# Patient Record
Sex: MALE | Race: WHITE | NOT HISPANIC OR LATINO | Employment: OTHER | URBAN - METROPOLITAN AREA
[De-identification: names, ages, dates, MRNs, and addresses within clinical notes are randomized per-mention and may not be internally consistent; named-entity substitution may affect disease eponyms.]

---

## 2018-05-22 ENCOUNTER — TELEPHONE (OUTPATIENT)
Dept: GASTROENTEROLOGY | Facility: CLINIC | Age: 68
End: 2018-05-22

## 2018-07-19 ENCOUNTER — MEDICAL CORRESPONDENCE (OUTPATIENT)
Dept: HEALTH INFORMATION MANAGEMENT | Facility: CLINIC | Age: 68
End: 2018-07-19

## 2018-07-19 ENCOUNTER — TRANSFERRED RECORDS (OUTPATIENT)
Dept: HEALTH INFORMATION MANAGEMENT | Facility: CLINIC | Age: 68
End: 2018-07-19

## 2018-07-20 ENCOUNTER — TRANSFERRED RECORDS (OUTPATIENT)
Dept: HEALTH INFORMATION MANAGEMENT | Facility: CLINIC | Age: 68
End: 2018-07-20

## 2018-08-02 ENCOUNTER — TELEPHONE (OUTPATIENT)
Dept: GASTROENTEROLOGY | Facility: CLINIC | Age: 68
End: 2018-08-02

## 2018-08-02 NOTE — TELEPHONE ENCOUNTER
Patient scheduled for EGD     Indication for procedure. Scott's esophagus with dysplasia    Referring Provider. Ijeoma Chaudhari    ? No     Arrival time verified? Patient to arrive at 220 pm     Facility location verified? [address]     Instructions given regarding prep and procedure. Transportation policy reviewed and verbalized understanding.     Prep Type NPO     Are you taking any anticoagulants or blood thinners? Denies     Instructions given? Yes     Electronic implanted devices? Denies     Pre procedure teaching completed? Yes    Transportation from procedure? Yes     H&P / Pre op physical completed? N/A    Samuel Burton RN

## 2018-08-08 ENCOUNTER — HOSPITAL ENCOUNTER (OUTPATIENT)
Facility: CLINIC | Age: 68
Discharge: HOME | End: 2018-08-08
Attending: INTERNAL MEDICINE | Admitting: INTERNAL MEDICINE
Payer: MEDICARE

## 2018-08-08 ENCOUNTER — TELEPHONE (OUTPATIENT)
Dept: GASTROENTEROLOGY | Facility: CLINIC | Age: 68
End: 2018-08-08

## 2018-08-08 ENCOUNTER — SURGERY (OUTPATIENT)
Age: 68
End: 2018-08-08

## 2018-08-08 VITALS
OXYGEN SATURATION: 94 % | RESPIRATION RATE: 18 BRPM | DIASTOLIC BLOOD PRESSURE: 82 MMHG | SYSTOLIC BLOOD PRESSURE: 116 MMHG

## 2018-08-08 LAB
GLUCOSE BLDC GLUCOMTR-MCNC: 119 MG/DL (ref 70–99)
UPPER GI ENDOSCOPY: NORMAL

## 2018-08-08 PROCEDURE — 88305 TISSUE EXAM BY PATHOLOGIST: CPT | Performed by: INTERNAL MEDICINE

## 2018-08-08 PROCEDURE — 40000141 ZZH STATISTIC PERIPHERAL IV START W/O US GUIDANCE

## 2018-08-08 PROCEDURE — 82962 GLUCOSE BLOOD TEST: CPT

## 2018-08-08 PROCEDURE — 43239 EGD BIOPSY SINGLE/MULTIPLE: CPT | Performed by: INTERNAL MEDICINE

## 2018-08-08 PROCEDURE — 25000128 H RX IP 250 OP 636: Performed by: INTERNAL MEDICINE

## 2018-08-08 PROCEDURE — 99152 MOD SED SAME PHYS/QHP 5/>YRS: CPT | Performed by: INTERNAL MEDICINE

## 2018-08-08 PROCEDURE — 25000125 ZZHC RX 250: Performed by: INTERNAL MEDICINE

## 2018-08-08 RX ORDER — FENTANYL CITRATE 50 UG/ML
INJECTION, SOLUTION INTRAMUSCULAR; INTRAVENOUS PRN
Status: DISCONTINUED | OUTPATIENT
Start: 2018-08-08 | End: 2018-08-08 | Stop reason: HOSPADM

## 2018-08-08 RX ORDER — ONDANSETRON 2 MG/ML
4 INJECTION INTRAMUSCULAR; INTRAVENOUS EVERY 6 HOURS PRN
Status: CANCELLED | OUTPATIENT
Start: 2018-08-08

## 2018-08-08 RX ORDER — LIDOCAINE 40 MG/G
CREAM TOPICAL
Status: DISCONTINUED | OUTPATIENT
Start: 2018-08-08 | End: 2018-08-08 | Stop reason: HOSPADM

## 2018-08-08 RX ORDER — FLUMAZENIL 0.1 MG/ML
0.2 INJECTION, SOLUTION INTRAVENOUS
Status: CANCELLED | OUTPATIENT
Start: 2018-08-08 | End: 2018-08-09

## 2018-08-08 RX ORDER — ONDANSETRON 4 MG/1
4 TABLET, ORALLY DISINTEGRATING ORAL EVERY 6 HOURS PRN
Status: CANCELLED | OUTPATIENT
Start: 2018-08-08

## 2018-08-08 RX ORDER — ONDANSETRON 2 MG/ML
4 INJECTION INTRAMUSCULAR; INTRAVENOUS
Status: DISCONTINUED | OUTPATIENT
Start: 2018-08-08 | End: 2018-08-08 | Stop reason: HOSPADM

## 2018-08-08 RX ORDER — NALOXONE HYDROCHLORIDE 0.4 MG/ML
.1-.4 INJECTION, SOLUTION INTRAMUSCULAR; INTRAVENOUS; SUBCUTANEOUS
Status: CANCELLED | OUTPATIENT
Start: 2018-08-08 | End: 2018-08-09

## 2018-08-08 RX ADMIN — FENTANYL CITRATE 100 MCG: 50 INJECTION, SOLUTION INTRAMUSCULAR; INTRAVENOUS at 16:09

## 2018-08-08 RX ADMIN — MIDAZOLAM 1 MG: 1 INJECTION INTRAMUSCULAR; INTRAVENOUS at 16:09

## 2018-08-08 RX ADMIN — TOPICAL ANESTHETIC 1 SPRAY: 200 SPRAY DENTAL; PERIODONTAL at 16:12

## 2018-08-08 RX ADMIN — MIDAZOLAM 1 MG: 1 INJECTION INTRAMUSCULAR; INTRAVENOUS at 16:16

## 2018-08-08 NOTE — IP AVS SNAPSHOT
MRN:[id number]                      After Visit Summary   8/8/2018    Clyde Figueroa    MRN: [id number]           Thank you!     Thank you for choosing [facility] for your care. Our goal is always to provide you with excellent care. Hearing back from our patients is one way we can continue to improve our services. Please take a few minutes to complete the written survey that you may receive in the mail after you visit with us. Thank you!        Patient Information     Date Of Birth          1950        About your hospital stay     You were admitted on:  August 8, 2018 You last received care in the:  [facility], Endoscopy    You were discharged on:  August 8, 2018       Who to Call     For medical emergencies, please call 911.  For non-urgent questions about your medical care, please call your primary care provider or clinic, [phone]  For questions related to your surgery, please call your surgery clinic        Attending Provider     Provider Darren Carter MD Gastroenterology       Primary Care Provider Office Phone # Fax #    Luis Turpin [phone]      Further instructions from your care team       Discharge Instructions after  Upper Endoscopy (EGD)    Activity and Diet  You were given medicine for pain. You may be dizzy or sleepy.  For 24 hours:    Do not drive or use heavy equipment.    Do not make important decisions.    Do not drink any alcohol.  __x_ You may return to your regular diet.    Discomfort  You may have a sore throat for 2 to 3 days. It may help to:    Avoid hot liquids for 24 hours.    Use sore throat lozenges.    Gargle as needed with salt water up to 4 times a day. Mix 1 cup of warm water  with 1 teaspoon of salt. Do not swallow.    You may take Tylenol (acetaminophen) for pain unless your doctor has told you not to.    Do not take aspirin or ibuprofen (Advil, Motrin) or other NSAIDS  (anti-inflammatory drugs) for _3__  "days.    Follow-up  _x__ We took small tissue samples for study. If you do not have a follow-up visit scheduled,  call your provider s office in 2 weeks for the results.    When to call us:  Problems are rare. Call right away if you have:    Unusual throat pain or trouble swallowing    Unusual pain in belly or chest that is not relieved by belching or passing air    Black stools (tar-like looking bowel movement)    Temperature above 100.6  F. (37.5  C).    If you vomit blood or have severe pain, go to an emergency room.    If you have questions, call:  Monday to Friday, 7 a.m. to 4:30 p.m.: Endoscopy: [phone] (We may have to call you back)    After hours: Hospital: [phone] (Ask for the GI fellow on call)    Pending Results     No orders found from 2018 to 2018.            Admission Information     Date & Time Provider Department Dept. Phone    2018 Darren Dye MD [facility], [place], Endoscopy [phone]      Your Vitals Were     Blood Pressure Respirations Pulse Oximetry             115/65 10 96%         [employer] Information     [employer] lets you send messages to your doctor, view your test results, renew your prescriptions, schedule appointments and more. To sign up, go to [email or web address] . Click on \"Log in\" on the left side of the screen, which will take you to the Welcome page. Then click on \"Sign up Now\" on the right side of the page.     You will be asked to enter the access code listed below, as well as some personal information. Please follow the directions to create your username and password.     Your access code is: [email or web address]  Expires: 2018  4:38 PM     Your access code will  in 90 days. If you need help or a new code, please call your [place] clinic or [phone].        Care EveryWhere ID     This is your Care EveryWhere ID. This could be used by other organizations to access your [place] medical records  [id number]        Equal Access to Services  "    ABDULKADIR [facility]: Hadii aad ku hadomaro Sodenisali, waaxda luqadaha, qaybta kaalmada adeegyada, adrianna yong chafreddy trevino pawelleidy duran . So [facility] [phone].    ATENCIÓN: Si habla español, tiene a blanco disposición servicios gratuitos de asistencia lingüística. Llame al [phone].    We comply with applicable federal civil rights laws and [place] laws. We do not discriminate on the basis of race, color, national origin, age, disability, sex, sexual orientation, or gender identity.               Review of your medicines      CONTINUE these medicines which have NOT CHANGED        Dose / Directions    ASPIRIN ADULT LOW STRENGTH PO        Refills:  0       dutasteride 0.5 MG capsule   Commonly known as:  AVODART        Dose:  0.5 mg   Take 0.5 mg by mouth every 48 hours   Refills:  0       GLIMEPIRIDE PO        Dose:  4 mg   Take 4 mg by mouth 2 times daily   Refills:  0       LISINOPRIL PO        Take by mouth daily   Refills:  0       metFORMIN 500 MG/5ML Soln solution   Commonly known as:  GLUCOPHAGE        Take by mouth 2 times daily (with meals)   Refills:  0       metoprolol tartrate 25 MG tablet   Commonly known as:  LOPRESSOR   Used for:  Atrial fibrillation (H)        Dose:  25 mg   Take 1 tablet (25 mg) by mouth 2 times daily   Quantity:  60 tablet   Refills:  0       pantoprazole 40 MG EC tablet   Commonly known as:  PROTONIX   Used for:  Gastroesophageal reflux disease with esophagitis        Dose:  40 mg   Take 1 tablet (40 mg) by mouth daily   Quantity:  90 tablet   Refills:  0       ROPINIROLE HCL PO        Dose:  0.25 mg   Take 0.25 mg by mouth At Bedtime   Refills:  0       simvastatin 40 MG tablet   Commonly known as:  ZOCOR        Dose:  40 mg   Take 1 tablet (40 mg) by mouth every evening   Quantity:  30 tablet   Refills:  0       testosterone 50 MG/5GM (1%) topical gel   Commonly known as:  ANDROGEL/TESTIM        Place onto the skin daily   Refills:  0       VITAMIN D (CHOLECALCIFEROL) PO         Dose:  2000 Units   Take 2,000 Units by mouth daily   Refills:  0                Protect others around you: Learn how to safely use, store and throw away your medicines at www.disposemymeds.org.             Medication List: This is a list of all your medications and when to take them. Check marks below indicate your daily home schedule. Keep this list as a reference.      Medications           Morning Afternoon Evening Bedtime As Needed    ASPIRIN ADULT LOW STRENGTH PO                                dutasteride 0.5 MG capsule   Commonly known as:  AVODART   Take 0.5 mg by mouth every 48 hours                                GLIMEPIRIDE PO   Take 4 mg by mouth 2 times daily                                LISINOPRIL PO   Take by mouth daily                                metFORMIN 500 MG/5ML Soln solution   Commonly known as:  GLUCOPHAGE   Take by mouth 2 times daily (with meals)                                metoprolol tartrate 25 MG tablet   Commonly known as:  LOPRESSOR   Take 1 tablet (25 mg) by mouth 2 times daily                                pantoprazole 40 MG EC tablet   Commonly known as:  PROTONIX   Take 1 tablet (40 mg) by mouth daily                                ROPINIROLE HCL PO   Take 0.25 mg by mouth At Bedtime                                simvastatin 40 MG tablet   Commonly known as:  ZOCOR   Take 1 tablet (40 mg) by mouth every evening                                testosterone 50 MG/5GM (1%) topical gel   Commonly known as:  ANDROGEL/TESTIM   Place onto the skin daily                                VITAMIN D (CHOLECALCIFEROL) PO   Take 2,000 Units by mouth daily

## 2018-08-08 NOTE — DISCHARGE INSTRUCTIONS
Discharge Instructions after  Upper Endoscopy (EGD)    Activity and Diet  You were given medicine for pain. You may be dizzy or sleepy.  For 24 hours:    Do not drive or use heavy equipment.    Do not make important decisions.    Do not drink any alcohol.  __x_ You may return to your regular diet.    Discomfort  You may have a sore throat for 2 to 3 days. It may help to:    Avoid hot liquids for 24 hours.    Use sore throat lozenges.    Gargle as needed with salt water up to 4 times a day. Mix 1 cup of warm water  with 1 teaspoon of salt. Do not swallow.    You may take Tylenol (acetaminophen) for pain unless your doctor has told you not to.    Do not take aspirin or ibuprofen (Advil, Motrin) or other NSAIDS  (anti-inflammatory drugs) for _3__ days.    Follow-up  _x__ We took small tissue samples for study. If you do not have a follow-up visit scheduled,  call your provider s office in 2 weeks for the results.    When to call us:  Problems are rare. Call right away if you have:    Unusual throat pain or trouble swallowing    Unusual pain in belly or chest that is not relieved by belching or passing air    Black stools (tar-like looking bowel movement)    Temperature above 100.6  F. (37.5  C).    If you vomit blood or have severe pain, go to an emergency room.    If you have questions, call:  Monday to Friday, 7 a.m. to 4:30 p.m.: Endoscopy: [phone] (We may have to call you back)    After hours: Hospital: [phone] (Ask for the GI fellow on call)

## 2018-08-08 NOTE — TELEPHONE ENCOUNTER
Pt seen for endoscopy today.  Per [facility] note, apparently referring MD is Dr. Ijeoma Chaudhari.   She was not listed in Provation report, is not an option to add and is not an option in EPIC.      Scheduling pool:  Please make sure this ordering MD is added to report and copy of report sent to that office.    TRINITY Dye MD  Associate Professor of Medicine  Division of Gastroenterology, Hepatology and Nutrition  [facility]

## 2018-08-08 NOTE — IP AVS SNAPSHOT
[facility], [place], Endoscopy    [address] [facility] [address]    Phone:  [phone]                                       After Visit Summary   8/8/2018    Clyde Figueroa    MRN: [id number]           After Visit Summary Signature Page     I have received my discharge instructions, and my questions have been answered. I have discussed any challenges I see with this plan with the nurse or doctor.    ..........................................................................................................................................  Patient/Patient Representative Signature      ..........................................................................................................................................  Patient Representative Print Name and Relationship to Patient    ..................................................               ................................................  Date                                            Time    ..........................................................................................................................................  Reviewed by Signature/Title    ...................................................              ..............................................  Date                                                            Time

## 2018-08-08 NOTE — LETTER
Patient:  Jaime Figueroa  :   1950  MRN:     [id number]        Mr.Richard JOHN Figueroa  [address] [facility] [address]        2018    Dear ,    We are writing to inform you of your test results. As you will recall, you were referred for follow-up endoscopy to follow a problem called Whelan's esophagus. After reviewing these biopsy results and the previous endoscopic images, I am not convinced that this requires any further evaluation. 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 This does not require repeat endoscopic surveillance in my opinion.    Please feel free to call if you have any questions about this letter. I can be reached at [phone].    TRINITY Escobedo MD  Associate Professor of Medicine  Division of Gastroenterology, Hepatology and Nutrition  [facility]      Resulted Orders   Surgical pathology exam   Result Value Ref Range    Copath Report       Patient Name: JAIME FIGUEROA  MR#: [id number]  Specimen #: [id number]  Collected: 2018  Received: 2018  Reported: 2018 11:58  Ordering Phy(s): BENNETT ESCOBEDO    For improved result formatting, select 'View Enhanced Report Format' under   Linked Documents section.    SPECIMEN(S):  Gastroesophageal junction biopsy    FINAL DIAGNOSIS:  Gastroesophageal junction, biopsy  - Fragments of squamous and columnar mucosa consistent with the   squamocolumnar junction  - The squamous mucosa demonstrates intraepithelial lymphocytosis without   specific features of reflux  - The columnar mucosa is gastric Cardia with extensive incomplete   intestinal metaplasia  - No evidence of dysplasia or malignancy    I have personally reviewed all specimens and/or slides, including the   listed special stains, and used them  with my medical judgement to determine or confirm the final  "diagnosis.    Electronically signed out by:    Huber Herrera M.D., Physicians    CLINICAL HISTORY:  Esophagu s, evaluate for dysplasia  GROSS:  The specimen is received in formalin with proper patient identification,   labeled \"GE junction biopsies\".  The  specimen consists of three irregular tan pieces of soft tissue 0.3-0.6 cm   in greatest dimension which are  entirely submitted in cassette A1. (Dictated by: Bulmaro Charles 8/8/2018   05:12 PM)    MICROSCOPIC:  A formal microscopic examination was performed.    CPT Codes:  A: 85004-AA6    TESTING LAB LOCATION:  [facility], [address]   [address]  [phone]    COLLECTION SITE:  Client: [facility]  Location: [facility] ([facility])                         "

## 2018-08-09 LAB — COPATH REPORT: NORMAL

## 2021-06-17 ENCOUNTER — TRANSCRIBE ORDERS (OUTPATIENT)
Dept: OTHER | Age: 71
End: 2021-06-17

## 2021-06-17 DIAGNOSIS — K31.5 DUODENAL STENOSIS: ICD-10-CM

## 2021-06-17 DIAGNOSIS — R09.89 CHOKING EPISODE: ICD-10-CM

## 2021-06-17 DIAGNOSIS — Q45.1 ANNULAR PANCREAS: ICD-10-CM

## 2021-06-17 DIAGNOSIS — K44.9 HIATAL HERNIA: ICD-10-CM

## 2021-06-17 DIAGNOSIS — K22.2 SCHATZKI'S RING: Primary | ICD-10-CM

## 2021-06-17 DIAGNOSIS — K22.9 IRREGULAR Z LINE OF ESOPHAGUS: ICD-10-CM

## 2021-06-22 ENCOUNTER — TELEPHONE (OUTPATIENT)
Dept: GASTROENTEROLOGY | Facility: CLINIC | Age: 71
End: 2021-06-22

## 2021-06-22 DIAGNOSIS — Z11.59 ENCOUNTER FOR SCREENING FOR OTHER VIRAL DISEASES: ICD-10-CM

## 2021-06-22 DIAGNOSIS — R13.10 DYSPHAGIA: Primary | ICD-10-CM

## 2021-06-22 NOTE — TELEPHONE ENCOUNTER
Per Dr Dye  Recommend EGD with me ASAP.   Is on Xarelto which will need held.   Looks like I have several openings next Wed.     Patient agreeable to schedule, orders placed, message sent to scheduling.    ML

## 2021-06-22 NOTE — TELEPHONE ENCOUNTER
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                        - Repeat upper endoscopy for surveillance based on                        pathology results. Likely 5 years unless dysplasia                        identified    Per Referring MD  - [facility] CONSULT GASTROENTEROLOGY  Reviewed EGD from 2018 completed at [facility] which showed a patent schatzki ring and irregular z line consistent with previous diagnosis of barretts  Reviewed pathology which showed no evidence dysplasia and therefore 5 yr surveillance was recommended  However due to recent choking episode, patient desires to follow up sooner which we will arrange      MD review date: 6/22/21  MD Decision for clinic consultation/Orders:            Referral updates/Patient contacted:

## 2021-06-22 NOTE — TELEPHONE ENCOUNTER
Screening Questions  1. What insurance is in the chart? BCBS     2.  Ordering/Referring Provider: Carol     3. BMI 22.9    4. Are you on daily home oxygen? N    5. Do you have a history of difficult airway? N - BUT DOES HAVE SLEEP APNEA     6. Have you had a heart, lung, or liver transplant? N    7. Are you currently on dialysis? N    8. Have you had a stroke or Transient ischemic atttack (TIA) within 6 months? N    9. In the past 6 months, have you had any heart related issues including cardiomyopathy or heart attack?         If yes, did it require cardiac stenting or other implantable device?N    10. Do you have any implantable devices in your body (pacemaker, defib, LVAD)? N    11. Do you take nitroglycerin? If yes, how often? N    12. Are you currently taking any blood thinners? Yes -- Xarelto/20mg/daily     13. Are you a diabetic? Yes     14. (Females) Are you currently pregnant?   If yes, how many weeks?    15. Have you had a procedure in the past that was difficult to tolerate with conscious sedation? Any allergies to Fentanyl or Versed N    16. Are you taking any scheduled prescription narcotics more than once daily? N    17. Do you have any chemical dependencies such as alcohol, street drugs, or methadone? N    18. Do you have any history of post-traumatic stress syndrome or mental health issues? N    19. Do you transfer independently? Y    20.  Do you have any issues with constipation?     21. Preferred Pharmacy for Pre Prescription On Chart/ [facility]     Scheduling Details    Procedure Scheduled: EGD  Provider/Surgeon: Carol   Date of Procedure: 06/30/2021  Location: [address] [facility]/ [facility]    Caller (Please ask for phone number if not scheduled by patient): Clyde Figueroa/Self      Sedation Type: CS  Conscious Sedation- Needs  for 6 hours after the procedure  MAC/General-Needs  for 24 hours after procedure    Pre-op Required at [facility] and OR for  MAC sedation:   (if yes advise patient they will need a  pre-op prior to procedure)      Is patient on blood thinners? -Y (If yes- inform patient to follow up with PCP or provider for follow up instructions)     Informed patient they will need an adult  Y  Cannot take any type of public or medical transportation alone    Informed Patient of COVID Test Requirement Y    Confirmed Nurse will call to complete assessment Y    Additional comments: N

## 2021-06-23 ENCOUNTER — TELEPHONE (OUTPATIENT)
Dept: GASTROENTEROLOGY | Facility: CLINIC | Age: 71
End: 2021-06-23

## 2021-06-24 NOTE — TELEPHONE ENCOUNTER
Patient returned call.     Pre assessment questions completed for upcoming EGD procedure scheduled on 6/30/21    COVID test scheduled 6/26/21 at [facility]. Patient states he has the fax number to send results.     Patient states he is on Xarelto. Per patient he was told to hold for 2 days prior.     Reviewed EGD prep instructions with patient.     Procedural arrival time and facility location reviewed.    Designated  policy reviewed.    Patient verbalized understanding and had no questions or concerns at this time.    Cher Montemayor RN

## 2021-06-24 NOTE — TELEPHONE ENCOUNTER
"Staff message received:    \"Clyde is and (ASAP) pt -- scheduled with Dr. Medina for next Wednesday 06/30/2021 and pt is on blood thinners and a diabetic. Can Nurse please reach out and touch base with pt.  -- pt is also from [facility] and will be doing COVID test [place] north.\"    ---------------------------------------------------  Attempted to contact patient regarding upcoming EGD procedure on 6/30/21 for pre assessment questions. No answer.     Left message to return call to [phone] #2    Covid test scheduled? Need to confirm that it is within 4 days of procedure.     Blood thinners? Need to verify which blood thinning medication patient is referring to.     Cher Montemayor RN      "

## 2021-06-30 ENCOUNTER — HOSPITAL ENCOUNTER (OUTPATIENT)
Facility: CLINIC | Age: 71
Discharge: HOME | End: 2021-06-30
Attending: INTERNAL MEDICINE | Admitting: INTERNAL MEDICINE
Payer: COMMERCIAL

## 2021-06-30 VITALS
BODY MASS INDEX: 23.53 KG/M2 | TEMPERATURE: 97.2 F | RESPIRATION RATE: 14 BRPM | DIASTOLIC BLOOD PRESSURE: 80 MMHG | HEIGHT: 72 IN | WEIGHT: 173.72 LBS | SYSTOLIC BLOOD PRESSURE: 133 MMHG | OXYGEN SATURATION: 97 % | HEART RATE: 62 BPM

## 2021-06-30 LAB
GLUCOSE BLDC GLUCOMTR-MCNC: 120 MG/DL (ref 70–99)
UPPER GI ENDOSCOPY: NORMAL

## 2021-06-30 PROCEDURE — 250N000011 HC RX IP 250 OP 636: Performed by: INTERNAL MEDICINE

## 2021-06-30 PROCEDURE — 250N000009 HC RX 250: Performed by: INTERNAL MEDICINE

## 2021-06-30 PROCEDURE — 43248 EGD GUIDE WIRE INSERTION: CPT | Performed by: INTERNAL MEDICINE

## 2021-06-30 PROCEDURE — 999N000128 HC STATISTIC PERIPHERAL IV START W/O US GUIDANCE

## 2021-06-30 PROCEDURE — 43239 EGD BIOPSY SINGLE/MULTIPLE: CPT | Mod: XS | Performed by: INTERNAL MEDICINE

## 2021-06-30 PROCEDURE — 88305 TISSUE EXAM BY PATHOLOGIST: CPT | Mod: TC | Performed by: INTERNAL MEDICINE

## 2021-06-30 PROCEDURE — 88305 TISSUE EXAM BY PATHOLOGIST: CPT | Mod: 26 | Performed by: PATHOLOGY

## 2021-06-30 PROCEDURE — 82962 GLUCOSE BLOOD TEST: CPT | Mod: GZ

## 2021-06-30 PROCEDURE — 250N000013 HC RX MED GY IP 250 OP 250 PS 637: Performed by: INTERNAL MEDICINE

## 2021-06-30 PROCEDURE — G0500 MOD SEDAT ENDO SERVICE >5YRS: HCPCS | Performed by: INTERNAL MEDICINE

## 2021-06-30 RX ORDER — CHLORAL HYDRATE 500 MG
1 CAPSULE ORAL
COMMUNITY

## 2021-06-30 RX ORDER — ONDANSETRON 2 MG/ML
4 INJECTION INTRAMUSCULAR; INTRAVENOUS
Status: DISCONTINUED | OUTPATIENT
Start: 2021-06-30 | End: 2021-06-30 | Stop reason: HOSPADM

## 2021-06-30 RX ORDER — PROCHLORPERAZINE MALEATE 5 MG
5 TABLET ORAL EVERY 6 HOURS PRN
Status: DISCONTINUED | OUTPATIENT
Start: 2021-06-30 | End: 2021-07-05 | Stop reason: HOSPADM

## 2021-06-30 RX ORDER — FINASTERIDE 5 MG/1
TABLET, FILM COATED ORAL
COMMUNITY
Start: 2013-05-07 | End: 2022-06-01

## 2021-06-30 RX ORDER — FLUMAZENIL 0.1 MG/ML
0.2 INJECTION, SOLUTION INTRAVENOUS
Status: ACTIVE | OUTPATIENT
Start: 2021-06-30 | End: 2021-06-30

## 2021-06-30 RX ORDER — FENTANYL CITRATE 50 UG/ML
INJECTION, SOLUTION INTRAMUSCULAR; INTRAVENOUS PRN
Status: COMPLETED | OUTPATIENT
Start: 2021-06-30 | End: 2021-06-30

## 2021-06-30 RX ORDER — NALOXONE HYDROCHLORIDE 0.4 MG/ML
0.4 INJECTION, SOLUTION INTRAMUSCULAR; INTRAVENOUS; SUBCUTANEOUS
Status: DISCONTINUED | OUTPATIENT
Start: 2021-06-30 | End: 2021-07-05 | Stop reason: HOSPADM

## 2021-06-30 RX ORDER — BLOOD SUGAR DIAGNOSTIC
STRIP MISCELLANEOUS
COMMUNITY
Start: 2021-03-30 | End: 2022-03-30

## 2021-06-30 RX ORDER — NALOXONE HYDROCHLORIDE 0.4 MG/ML
0.2 INJECTION, SOLUTION INTRAMUSCULAR; INTRAVENOUS; SUBCUTANEOUS
Status: DISCONTINUED | OUTPATIENT
Start: 2021-06-30 | End: 2021-07-05 | Stop reason: HOSPADM

## 2021-06-30 RX ORDER — TAMSULOSIN HYDROCHLORIDE 0.4 MG/1
CAPSULE ORAL
COMMUNITY
Start: 2020-07-24

## 2021-06-30 RX ORDER — HYDROCORTISONE 2.5 %
CREAM (GRAM) TOPICAL
COMMUNITY
Start: 2020-08-18 | End: 2021-08-18

## 2021-06-30 RX ORDER — BLOOD-GLUCOSE METER
EACH MISCELLANEOUS
COMMUNITY
Start: 2021-03-30 | End: 2022-03-30

## 2021-06-30 RX ORDER — SIMETHICONE 40MG/0.6ML
SUSPENSION, DROPS(FINAL DOSAGE FORM)(ML) ORAL PRN
Status: COMPLETED | OUTPATIENT
Start: 2021-06-30 | End: 2021-06-30

## 2021-06-30 RX ORDER — RIVAROXABAN 20 MG/1
20 TABLET, FILM COATED ORAL DAILY
COMMUNITY
Start: 2021-04-01

## 2021-06-30 RX ORDER — DULAGLUTIDE 1.5 MG/.5ML
INJECTION, SOLUTION SUBCUTANEOUS
COMMUNITY
Start: 2021-01-18

## 2021-06-30 RX ORDER — CARBIDOPA AND LEVODOPA 25; 100 MG/1; MG/1
TABLET, EXTENDED RELEASE ORAL
Status: ON HOLD | COMMUNITY
Start: 2013-03-18 | End: 2022-06-03

## 2021-06-30 RX ORDER — ONDANSETRON 2 MG/ML
4 INJECTION INTRAMUSCULAR; INTRAVENOUS EVERY 6 HOURS PRN
Status: DISCONTINUED | OUTPATIENT
Start: 2021-06-30 | End: 2021-07-05 | Stop reason: HOSPADM

## 2021-06-30 RX ORDER — LANCETS
EACH MISCELLANEOUS
COMMUNITY
Start: 2021-03-30

## 2021-06-30 RX ORDER — LIDOCAINE 40 MG/G
CREAM TOPICAL
Status: DISCONTINUED | OUTPATIENT
Start: 2021-06-30 | End: 2021-06-30 | Stop reason: HOSPADM

## 2021-06-30 RX ORDER — ONDANSETRON 4 MG/1
4 TABLET, ORALLY DISINTEGRATING ORAL EVERY 6 HOURS PRN
Status: DISCONTINUED | OUTPATIENT
Start: 2021-06-30 | End: 2021-07-05 | Stop reason: HOSPADM

## 2021-06-30 RX ADMIN — FENTANYL CITRATE 50 MCG: 50 INJECTION, SOLUTION INTRAMUSCULAR; INTRAVENOUS at 11:15

## 2021-06-30 RX ADMIN — MIDAZOLAM 1 MG: 1 INJECTION INTRAMUSCULAR; INTRAVENOUS at 11:04

## 2021-06-30 RX ADMIN — FENTANYL CITRATE 100 MCG: 50 INJECTION, SOLUTION INTRAMUSCULAR; INTRAVENOUS at 11:02

## 2021-06-30 RX ADMIN — TOPICAL ANESTHETIC 1 SPRAY: 200 SPRAY DENTAL; PERIODONTAL at 11:01

## 2021-06-30 RX ADMIN — MIDAZOLAM 1 MG: 1 INJECTION INTRAMUSCULAR; INTRAVENOUS at 11:02

## 2021-06-30 RX ADMIN — SIMETHICONE 133 MG: 63.3; 3.7 SOLUTION/ DROPS ORAL at 10:45

## 2021-06-30 ASSESSMENT — MIFFLIN-ST. JEOR: SCORE: 1571

## 2021-06-30 NOTE — LETTER
"July 1, 2021      Jaime Figueroa  [address] [facility] [address]        Dear ,    The esophageal biopsies showed no concerning abnormalities. As we discussed, I believe that we can stop surveillance of the Whelan's esophagus at this point in light of the very small amount of involvement in the esophagus, your age and the lack of concerning features.    Please contact my office to discuss further evaluation if your swallowing complaints do not improve after the dilation that was performed.    Please feel free to call if you have any questions about this letter. I can be reached at [phone].    TRINITY Escobedo MD  Professor of Medicine  Division of Gastroenterology, Hepatology and Nutrition  [facility]      Resulted Orders   Surgical pathology exam   Result Value Ref Range    Copath Report       Patient Name: JAIME FIGUEROA  MR#: [id number]  Specimen #: [id number]  Collected: 6/30/2021  Received: 6/30/2021  Reported: 7/1/2021 11:11  Ordering Phy(s): BENNETT ESCOBEDO    For improved result formatting, select 'View Enhanced Report Format' under   Linked Documents section.    SPECIMEN(S):  Esophageal biopsy, distal, Barretts    FINAL DIAGNOSIS:  DISTAL ESOPHAGEAL BIOPSY:  - Metaplastic columnar epithelium with goblet cells compatible with   Whelan's esophagus (assuming the biopsy  came from the tubular esophagus), negative for dysplasia  - Adjacent esophageal mucosa with features of reflux    I have personally reviewed all specimens and/or slides, including the   listed special stains, and used them  with my medical judgement to determine or confirm the final diagnosis.    Electronically signed out by:    Trey Chowdary M.D., Physicians    CLINICAL HISTORY:  Dysphagia    GROSS:  The specimen is received in formalin with proper patient identification,   labeled \"distal  esophagus\".  The  specimen consists of multiple fragments of pink-white tissue ranging from   less than " 0.1-0.3 cm in greatest  dimension, filtered and submitted in toto in cassette A1. (Dictated by:   Chris Brunson 6/30/2021 12:15 PM)    MICROSCOPIC:  Microscopic exam was performed    The technical component of this testing was completed at the [facility], with the professional component performed   at the [facility], [address] [place] [id number] ([phone])    CPT Codes:  A: 77440-CT8    COLLECTION SITE:  Client: [facility]  Location: [facility] ([facility])           If you have any questions or concerns, please call the clinic at the number listed above.

## 2021-06-30 NOTE — OR NURSING
Patient had EGD with biopsies as well as savary dilation to 18. Patient tolerated procedure well under conscious sedation and 2 liters nasal cannula.

## 2021-07-01 LAB — COPATH REPORT: NORMAL

## 2022-03-29 ENCOUNTER — HOSPITAL ENCOUNTER (OUTPATIENT)
Age: 72
End: 2022-03-29
Payer: COMMERCIAL

## 2022-03-29 NOTE — PROGRESS NOTES
[facility]  Transfer Triage Note    Date of call: 03/29/22  Time of call: 12:38 PM    Current Patient Location: [facility] [place]  Current Level of Care: Med Surg    Vitals: BP:151/71 HR: 58 O2 Sats: 94 on RA  Diagnosis: gastric outlet obstruction  Is COVID-19 a concern? No  Reason for requested transfer: Procedure can be done here and not at referring hospital   Isolation Needs: None    Outside Records: Available  Additional records may be faxed to [phone].    Transfer accepted: Yes  Stability of Patient: Patient is vitally stable, with no critical labs, and will likely remain stable throughout the transfer process  Level of Care Needed: Med Surg  Telemetry Needed:  None  Expected Time of Arrival for Transfer: greater than 24 hours  Arrival Location:  [facility]    Recommendations for Management and Stabilization: Given    Additional Comments:     Received transfer request from [facility]    72M DM, HTN  P/w abd pain, N/V, 35 lb weight loss  Found to have gastric outlet obstruction  Decompressed with NGT  S/p multiple endoscopies at [place]  Known to have annular pancreas and duodenal stenosis  Requested transfer to [facility] for scope and stenting   Labs o/w unremarkable  CT scan yesterday reportedly unremarkable  Accepted for transfer to [facility] med/surg no tele for eval for endoscopy with ?stenting, known to [facility]  No beds available today. Sending provider aware and will manage pt in interim, will call if clinical change.       Lino Steinberg MD       Addendum 4/1/22 12:15pm: Called by Dr. Dye of GI that this patient does not need to be transferred for endoscopy as treatment for this GOO 2/2 annular pancreas would be treated by bariatric surgery. Dr. Dye talked to the [facility] bariatric provider who would be agreeable to pursuing surgical intervention when this patient is admitted. Patient is still accepted with the plan for bariatric  surgery consult when bed is available.     Precious Fenton MD  Internal Medicine Hospitalist  P    Addendum 4/3/2022:  Blood pressure 153/81  heart rate  oxygen saturation 88% temperature   The patient presented with aktered mental status during his hospitalization in another hospital, on work up he was found to have developed an acute ischemic stroke of the left parietal area likely 2/2 embolic stroke, for which he received TPA on 22.    Adriane Barrios MD

## 2022-04-01 ENCOUNTER — TELEPHONE (OUTPATIENT)
Dept: GASTROENTEROLOGY | Facility: CLINIC | Age: 72
End: 2022-04-01
Payer: COMMERCIAL

## 2022-04-01 NOTE — TELEPHONE ENCOUNTER
Received a phone note from my clinic re this pt.  Pt apparently called clinic and asked if I could expedite his pending transfer.    Neither myself nor GI on call were previously aware of this pending transfer.    Pt is known to me from an EUS in 2015 for evaluation of a duodenal stenosis. This was found to be due to annular pancreas. He was asymptomatic and thus no intervention was indicated. More recently he had been seen for Whelan's esophagus and dysphagia related to a Schatzki's ring.    Now he is admitted at [facility] with gastric outlet obstruction requiring NG suction. Per review of records they have attempted to transfer pt to multiple facilities for endoscopic stenting. Other centers have refused and he is now on list for transfer here.    We would not be recommending endoscopic therapy for annular pancreas. In an appropriate surgical candicate, optimal therapy would be laparoscopic duodenojejunostomy.     This was discussed with Dr. Zuniga, who is on call for Bariatric/MIS surgery today and this weekend who is in agreement and will see the pt if transferred.     Discussed with triage hospitalist.    TRINITY Dye MD  Professor of Medicine  Division of Gastroenterology, Hepatology and Nutrition  [facility]

## 2022-04-01 NOTE — TELEPHONE ENCOUNTER
[facility] Call Center    Phone Message    May a detailed message be left on voicemail: yes     Reason for Call: Other:      Pt is requesting a call back to discuss his current situation.     He stated that he is currently in the [facility] with a blockage in his intestine next to the pancrease.       Action Taken: Message routed to:  [facility] Panc/Bili    Travel Screening: Not Applicable

## 2022-04-01 NOTE — CONFIDENTIAL NOTE
Returned pt call. I was told by the Doctor that I have a blockage around my pancreas.  Pt admitted 3/29 to [facility], called me to see if he can get transferred sooner to the [facility].    Per the note sounds like they are trying to get him here.    Gastric outlet obstruction. This gentleman was admitted with abdominal pain and recurrent nausea. He was found to have gastric outlet obstruction. He has NG tube for decompression. Given the complexity of his duodenal stenosis, which was felt to be secondary to annular pancreas, gastroenterology service at [facility] declined transfer to their facility and recommended transfer to the [facility]. Talked to GI at [facility] and GI felt this would be better managed at the [facility]. I already got an acceptance there but still awaiting bed availability. In the interim, he continues to have NG tube for drainage. NG output around a liter last 24 hours.    Message routed to Dr Dye as an KIYA Hendricks, RN, BSN,   [facility]  Care coordinator

## 2022-04-21 ENCOUNTER — TELEPHONE (OUTPATIENT)
Dept: ENDOCRINOLOGY | Facility: CLINIC | Age: 72
End: 2022-04-21
Payer: COMMERCIAL

## 2022-04-21 ENCOUNTER — TELEPHONE (OUTPATIENT)
Dept: GASTROENTEROLOGY | Facility: CLINIC | Age: 72
End: 2022-04-21
Payer: COMMERCIAL

## 2022-04-21 DIAGNOSIS — R19.7 DIARRHEA: Primary | ICD-10-CM

## 2022-04-21 NOTE — TELEPHONE ENCOUNTER
Patient contacting GI to see what is the nest step in care, patient states he still having issues, even after procedure that was done. Message to Fátima.

## 2022-04-21 NOTE — CONFIDENTIAL NOTE
Called pt to discuss Dr Dye's recommendations:   He will need a surgical consult with Dr. Zuniga to discuss laparoscopic duodenojejunostomy.   This won't likely fix the diarrhea.   (message was left by Dr Zuniga's office, asked pt to please check his VM and call back)    1) Please get [facility] access to his [facility] records. (appears all [facility] records are visible in Care[facility]where)  2) Please arrange for stool for C diff and calprotectin through home institution. (ordered and faxed to the [facility] per pt's request fax #[phone])  3) Please arrange for clinic visit with me (can be virtual) in a few weeks to follow-up re the diarrhea. He will continue the imodium for now.     Virtual Clinic arranged with Dr Dye on 5/16 at 1pm. Please use SO cell phone for virtual visit. Cate [phone] , message routed to clinic coordinators to schedule    Jennifer Hendricks, RN, BSN,   [facility]  Care coordinator   [phone]  Fax [phone]

## 2022-04-21 NOTE — TELEPHONE ENCOUNTER
Called and left message for patient in regards to his symptoms. Per Dr. Zuniga, offered appointment by VIDEO or IN PERSON next Thursday at 7am or 8am. If patient would rather, can be seen in [place] in 2 weeks (would have Leila earl). Left contact center phone number.

## 2022-04-22 ENCOUNTER — TRANSCRIBE ORDERS (OUTPATIENT)
Dept: OTHER | Age: 72
End: 2022-04-22

## 2022-04-22 DIAGNOSIS — R13.10 DYSPHAGIA: Primary | ICD-10-CM

## 2022-04-22 NOTE — TELEPHONE ENCOUNTER
Patient called and left voicemail requesting an appointment with Dr. Zuniga in [place]. Called and left message for WANDA Dee coordinator to arrange appointment.

## 2022-05-04 NOTE — TELEPHONE ENCOUNTER
REFERRAL INFORMATION:    Referring Provider:  N/A    Referring Clinic: N/A    Reason for Visit/Diagnosis: Annular Pancreas        FUTURE VISIT INFORMATION:    Appointment Date: 05.05.2022    Appointment Time: 07.:30a     NOTES RECORD STATUS  DETAILS   OFFICE NOTE from Referring Provider     OFFICE NOTE from Other Specialists     HOSPITAL DISCHARGE SUMMARY/ ED VISITS  Care Everywhere 04.05.2022 [facility]   OPERATIVE REPORT     ENDOSCOPY (EGD)  Internal  Care Everywhere 06.30.2021, 08.08.2018 04.05.2022   PERTINENT LABS Internal / CE    PATHOLOGY REPORTS (RELATED)     IMAGING (CT, MRI, US, XR)

## 2022-05-05 ENCOUNTER — PRE VISIT (OUTPATIENT)
Dept: ENDOCRINOLOGY | Facility: CLINIC | Age: 72
End: 2022-05-05
Payer: COMMERCIAL

## 2022-05-05 ENCOUNTER — PREP FOR PROCEDURE (OUTPATIENT)
Dept: SURGERY | Facility: CLINIC | Age: 72
End: 2022-05-05

## 2022-05-05 ENCOUNTER — OFFICE VISIT (OUTPATIENT)
Dept: ENDOCRINOLOGY | Facility: CLINIC | Age: 72
End: 2022-05-05
Payer: COMMERCIAL

## 2022-05-05 VITALS
WEIGHT: 160.2 LBS | HEART RATE: 69 BPM | OXYGEN SATURATION: 99 % | HEIGHT: 72 IN | BODY MASS INDEX: 21.7 KG/M2 | SYSTOLIC BLOOD PRESSURE: 128 MMHG | DIASTOLIC BLOOD PRESSURE: 62 MMHG

## 2022-05-05 DIAGNOSIS — K44.9 HIATAL HERNIA: ICD-10-CM

## 2022-05-05 DIAGNOSIS — Q45.1 ANNULAR PANCREAS: Primary | ICD-10-CM

## 2022-05-05 PROCEDURE — 99203 OFFICE O/P NEW LOW 30 MIN: CPT | Performed by: SURGERY

## 2022-05-05 RX ORDER — CEFAZOLIN SODIUM 2 G/50ML
2 SOLUTION INTRAVENOUS SEE ADMIN INSTRUCTIONS
Status: CANCELLED | OUTPATIENT
Start: 2022-05-05

## 2022-05-05 RX ORDER — HEPARIN SODIUM 5000 [USP'U]/.5ML
5000 INJECTION, SOLUTION INTRAVENOUS; SUBCUTANEOUS
Status: CANCELLED | OUTPATIENT
Start: 2022-05-05

## 2022-05-05 RX ORDER — CEFAZOLIN SODIUM 2 G/50ML
2 SOLUTION INTRAVENOUS
Status: CANCELLED | OUTPATIENT
Start: 2022-05-05

## 2022-05-05 ASSESSMENT — PAIN SCALES - GENERAL: PAINLEVEL: NO PAIN (0)

## 2022-05-05 NOTE — PROGRESS NOTES
New General Surgery Consultation Note        Clyde Figueroa  [id number]  1950  May 5, 2022        Dear Dr Turpin,     I had the pleasure of seeing your patient, Clyde Figueroa.  He is a 72 year old male who is being seen in consultation for the following concern(s).     CHIEF COMPLAINT:  History of annular pancreas.  Foregut GI complaints.    HISTORY OF PRESENT ILLNESS:  For the past 10-15 yrs Mr. Figueroa has had dysphagia with difficulty swallowing; this has been associated with other GI discomforts.  He has had numerous endoscopies and there have been several findings, including Schatzki's ring, hiatal hernia, and obstruction of 2nd portion of duodenum.    He has also had CT scan of abdomen/pelvis which documents presence of annular pancreas.    I spoke with him at some length about his symptoms; these tend to be of esophageal origin location, but this cannot be discerned specifically from symptoms attributable to the duodenal obstruction.    Dr. Dye from GI has recommended that I consider duodenojejunostomy bypass for Mr. Figueroa.   This would be laparoscopic.  After further discussing symptoms, we agreed together that hiatal hernia repair should be performed as well with gastropexy.      I note that he had stroke 1 month ago related to discontinued xarelto usage.  He will need to have neurology clearance prior to foregut surgery.    DURATION:  At least 10 yrs; he remembers specifically having no issues 20+ yrs ago and certainly no swallowing or GI problems when he was in his 20s or earlier.     MODIFYING FACTORS:  No flowsheet data found.    ASSOCIATED SIGNS/SYMPTOMS:   reflux.       Most recent Upper Endoscopy:  Maciej Miguel MD - 04/05/2022  4:08 PM CDT   Formatting of this note might be different from the original.   [facility]   _______________________________________________________________________________   Patient Name: Clyde Figueroa          Procedure Date:  4/5/2022   MRN: [id number]                       Gender: Male   Account Number: [id number]             YOB: 1950   Admit Type: Inpatient                   _______________________________________________________________________________     Procedure:                    Upper GI endoscopy   Proceduralist:                Maciej Miguel MD - [place]                                 Gastroenterology [facility]   Indications/Pre-Op Diagnosis: Stenosis of the duodenum leading to outlet                                 obstruction   Medications:                  General Anesthesia     Procedure Description:        Risk of bleeding, infection, perforation, need for surgery and        alternatives discussed.        The gastroscope was introduced through the mouth, and advanced to the        third part of duodenum. The upper GI endoscopy was accomplished without        difficulty. The patient tolerated the procedure well.     Complications:                No immediate complications.   Estimated Blood Loss & Specimen:        Estimated blood loss: none.        Specimen collected: None     Findings:        There were esophageal mucosal changes secondary to established        short-segment Whelan's disease present at the gastroesophageal        junction. The maximum longitudinal extent of these mucosal changes was 1        cm in length.        The exam of the esophagus was otherwise normal.        The entire examined stomach was normal.        An acquired benign-appearing, intrinsic severe stenosis was found in the        second portion of the duodenum and was traversed after dilation. A TTS        dilator was passed through the scope. Dilation with a 10-11-12 mm        balloon dilator was performed to 12 mm. The dilation site was examined        and showed mild mucosal disruption.        The exam of the duodenum was otherwise normal.     Impressions/Post-Op Diagnosis:        - Esophageal mucosal changes secondary to  established short-segment        Whelan's disease.        - Normal stomach.        - Acquired duodenal stenosis. Dilated.        - No specimens collected.     Recommendation:        - Return patient to [facility] for ongoing care.        - Full liquid diet today as tolerated.        - Continue PPI daily.        - Repeat EGD in 2 months for possible repeat dilation.        - Continue to avoid NSAIDs.         ___________________   Maciej Miguel MD   4/5/2022 4:07:54 PM   This report has been signed electronically.     Note Initiated On: 4/5/2022 3:16 PM  Specimen Collected: 04/05/22  3:16 PM Last Resulted: 04/05/22  3:16 PM   Received From: [facility]  Result Received: 04/21/22  3:44 PM         Prior UGI study from April 2022:  XR GASTROGRAFIN UPPER GI    Anatomical Region Laterality Modality   Esophagus, stomach and duodenum -- Other       Narrative    -------------------------------- Original Report   -------------------------------     EXAM:  UGI WITH GASTROVIEW, SINGLE CONTRAST     COMPARISON:  CT abdomen pelvis 3/29/2022.     CLINICAL INFORMATION: 72 year old Male presents with abdominal pain.   CT abdomen pelvis 3/9/2022 showed focal narrowing or stenosis of the duodenum at   the level of the pancreatic head, which may be associated with underlying occult   neoplasm. Associated more proximal dilation of the stomach and proximal portion   of the duodenum.        TECHNICAL INFORMATION:  Fluoroscopic and radiographic examination of the   stomach, duodenum and proximal jejunum were performed.     INTERPRETATION:      film shows NG tube in place with unremarkable bowel gas pattern.     Gastrografin and water were administered via the NG tube.     There was prompt opacification of the nondistended stomach exhibiting normal   rugal folds and no suspicious saccular outpouching.       There was slow flow of oral contrast from the stomach to the more distal   portions of the  duodenum.     There is redemonstration of marked narrowing of the second portion of the   duodenum measuring approximately 2 cm in length located just distal to the   distended duodenal bulb.  This short segment of narrowing is seen to   intermittently minimally dilate on fluoroscopy allowing passage of small amount   of oral contrast into the more distal portions of the duodenum.     Fluoroscopy time: 27 seconds   Fluoroscopy dose: 91.4 mGy   Fluoroscopy images: 55     Gastrografin: 100 mL   Water: 50 mL       IMPRESSION:     1.  Redemonstration of markedly narrowed second portion of the duodenum   measuring approximately 2 cm in length located just distal to the distended   duodenal bulb which is seen to intermittently minimally dilate on fluoroscopy   allowing passage of small amount of oral contrast into the more distal portions   of the duodenum.             Read by: Romel Kellogg M.D.       Prior MRI from 2015:  Pancreas: Annular pancreas encircling the second portion of the  duodenum with associated proximal 9 dilatation of the duodenal bulb,  similar from the prior study. Preserved T1 increased signal throughout  the pancreatic parenchyma. No focal lesions. No ductal dilatation. No  peripancreatic inflammatory changes. Also is normal variant  duplication of the pancreatic duct in the tail (as seen on series 5  images 11 through 13).     Bowel: Duodenal bulb mild dilatation as aforementioned. Otherwise, no  bowel loops dilatation. No bowel wall thickening.      Prior endoscopy June 2021:  Findings:        A non-obstructing and mild Schatzki ring was found at the        gastroesophageal junction. A guidewire was placed and the scope was        withdrawn. Dilation was performed with a Savary dilator with no        resistance at 18 mm.        The Z-line was irregular and was found 40 to 41 cm from the incisors.        Biopsies were taken with a cold forceps for histology.        A medium amount of food  (residue) was found in the gastric body.        The pylorus was normal.        The cardia and gastric fundus were normal on retroflexion.        Food (residue) was found in the duodenal bulb.                                                                                     Impression:          - Non-obstructing and mild Schatzki ring. Dilated to 18                        mm with Savary dilator. Unclear if this is the source of                        recent symptoms (which sounded suggestive of food                        impaction, now resolved). No other potential source of                        dysphagia identified. No mass, stricture or esophagitis.                        - Z-line irregular, 40 to 41 cm from the incisors.                        Biopsied given prior history of Whelan's. Previous                        biopsies were more suggestive of intestinal metaplasia                        of the cardia.                        - A medium amount of food (residue) in the stomach.                        - Normal pylorus.                        - Retained food in the duodenum.                        - Unable to pass from bulb to second portion, partly due                        to food. Pt has a known history of annular pancreatic                        which may be contributing to food retention. Current                        symptoms do not sound suggestive of gastric outlet                        obstructive symptoms.   Recommendation:      - Discharge patient to home (ambulatory).                        - Observe patient's clinical course following today's                        procedure with therapeutic intervention.                        - Await pathology results. If no dysplasia, I would not                        recommend further Whelan's surveillance in light of his                        age and ultrashort segment Whelan's (if truly                        Whelan's).                        If  symptoms persist, recommend referral to luminal GI                        clinic for further evaluation, possibly including                        manometry and esophagram vs UGI series.                        If symptoms were more consistent with duodenal                        obstruction from the annular pancreas, this would                        typically be treated with gastrojejunostomy to bypass                        the obstruction. It is not clear at this time that this                        is indicate.                                                                                       Electronically signed by Dr. Kamaljit Dye   ____________________   Darren Dye MD   6/30/2021 11:30:37 AM             ROS    PAST MEDICAL HISTORY:  Past Medical History:   Diagnosis Date     Diabetes mellitus (H)      Gastro-oesophageal reflux disease      History of blood transfusion      Hypertension      Pancreatic disease         PAST SURGICAL HISTORY:  Past Surgical History:   Procedure Laterality Date     APPENDECTOMY       ARTHROSCOPY SHOULDER ROTATOR CUFF REPAIR       ENDOSCOPIC RETROGRADE CHOLANGIOPANCREATOGRAM  5/14/2013    Procedure: ENDOSCOPIC RETROGRADE CHOLANGIOPANCREATOGRAM;  Endoscopic retrograde cholangiopancreatography. Balloon Dilation of Duodenal Stricture with  Endogastric Duodenoscopy;  Surgeon: Jhony Khan MD;  Location: [facility]     ENDOSCOPIC ULTRASOUND UPPER GASTROINTESTINAL TRACT (GI) N/A 7/30/2015    Procedure: ENDOSCOPIC ULTRASOUND, ESOPHAGOSCOPY / UPPER GASTROINTESTINAL TRACT (GI);  Surgeon: Darren Dye MD;  Location: [facility]     ESOPHAGOSCOPY, GASTROSCOPY, DUODENOSCOPY (EGD), COMBINED  4/10/2013    Procedure: COMBINED ESOPHAGOSCOPY, GASTROSCOPY, DUODENOSCOPY (EGD), BIOPSY SINGLE OR MULTIPLE;;  Surgeon: Bright Carl MD;  Location: [facility]     ESOPHAGOSCOPY, GASTROSCOPY, DUODENOSCOPY (EGD), COMBINED  4/30/2013    Procedure: COMBINED ESOPHAGOSCOPY,  GASTROSCOPY, DUODENOSCOPY (EGD);;  Surgeon: Darren Mejía MD;  Location: [facility]     ESOPHAGOSCOPY, GASTROSCOPY, DUODENOSCOPY (EGD), COMBINED  5/28/2013    Procedure: COMBINED ESOPHAGOSCOPY, GASTROSCOPY, DUODENOSCOPY (EGD);  Upper Endoscopy with Balloon Dilation *Latex Safe*;  Surgeon: Jhony Khan MD;  Location: [facility]     ESOPHAGOSCOPY, GASTROSCOPY, DUODENOSCOPY (EGD), COMBINED  6/18/2013    Procedure: COMBINED ESOPHAGOSCOPY, GASTROSCOPY, DUODENOSCOPY (EGD);  Upper Endoscopy with esophageal dilation;  Surgeon: Jhony Khan MD;  Location: [facility]     ESOPHAGOSCOPY, GASTROSCOPY, DUODENOSCOPY (EGD), COMBINED N/A 4/4/2015    Procedure: COMBINED ESOPHAGOSCOPY, GASTROSCOPY, DUODENOSCOPY (EGD);  Surgeon: Rodney Montesinos MD;  Location: [facility]     ESOPHAGOSCOPY, GASTROSCOPY, DUODENOSCOPY (EGD), COMBINED Left 7/15/2015    Procedure: COMBINED ESOPHAGOSCOPY, GASTROSCOPY, DUODENOSCOPY (EGD), BIOPSY SINGLE OR MULTIPLE;  Surgeon: Rodney Montesinos MD;  Location: [facility]     ESOPHAGOSCOPY, GASTROSCOPY, DUODENOSCOPY (EGD), COMBINED N/A 8/8/2018    Procedure: COMBINED ESOPHAGOSCOPY, GASTROSCOPY, DUODENOSCOPY (EGD), BIOPSY SINGLE OR MULTIPLE;  EGD;  Surgeon: Darren Dye MD;  Location: [facility]     ESOPHAGOSCOPY, GASTROSCOPY, DUODENOSCOPY (EGD), COMBINED N/A 6/30/2021    Procedure: ESOPHAGOGASTRODUODENOSCOPY, WITH BIOPSY;  Surgeon: Darren Dye MD;  Location: [facility]     HERNIA REPAIR          MEDICATIONS:  Current Outpatient Medications   Medication     ASPIRIN ADULT LOW STRENGTH PO     blood glucose (NO BRAND SPECIFIED) test strip     carbidopa-levodopa (SINEMET CR)  MG CR tablet     CONTOUR NEXT TEST test strip     dulaglutide (TRULICITY) 1.5 MG/0.5ML pen     dutasteride (AVODART) 0.5 MG capsule     empagliflozin (JARDIANCE) 25 MG TABS tablet     finasteride (PROSCAR) 5 MG tablet     fish oil-omega-3 fatty acids 1000 MG capsule     GLIMEPIRIDE PO     LISINOPRIL PO     metFORMIN  (GLUCOPHAGE) 500 MG/5ML SOLN     metoprolol (LOPRESSOR) 25 MG tablet     Microlet Lancets MISC     pantoprazole (PROTONIX) 40 MG enteric coated tablet     ROPINIROLE HCL PO     simvastatin (ZOCOR) 40 MG tablet     tamsulosin (FLOMAX) 0.4 MG capsule     testosterone (ANDROGEL/TESTIM) 50 MG/5GM (1%) gel     VITAMIN D, CHOLECALCIFEROL, PO     XARELTO ANTICOAGULANT 20 MG TABS tablet     No current facility-administered medications for this visit.        ALLERGIES:  No Known Allergies     SOCIAL HISTORY:  Social History     Socioeconomic History     Marital status: Single   Tobacco Use     Smoking status: Never Smoker   Substance and Sexual Activity     Alcohol use: Yes     Comment: Rare use     Drug use: No   Social History Narrative    Retired, former        FAMILY HISTORY:  Family History   Problem Relation Age of Onset     Cardiovascular Father      Peptic Ulcer Disease Mother         PHYSICAL EXAM:  Vital Signs: /62 (BP Location: Left arm, Patient Position: Sitting, Cuff Size: Adult Regular)   Pulse 69   Ht 1.829 m (6')   Wt 72.7 kg (160 lb 3.2 oz)   SpO2 99%   BMI 21.73 kg/m    HEENT: NCAT; MMM;   Lungs: Breathing unlabored  Abdomen: soft, nontender, without hepatosplenomegaly or masses     PHYSICAL EXAM AREA OF INTEREST:  No distension     PERTINENT IMAGING/TESTING:  See hpi    LABORATORY TESTING:  None more needed    ASSESSMENT:   Clyde Figueroa is a 72 year old male with annular pancreas; no prior treatment.     DISCUSSION OF RISKS:  I reviewed the risks of surgery with Clyde Figueroa.    These include, but are not limited to, death, myocardial infarction, pneumonia, urinary tract infection, deep venous thrombosis with or without pulmonary embolus, abdominal infection from bowel injury or abscess, bowel obstruction, wound infection, and bleeding.    PLAN:   Laparoscopic duodenojejunostomy; hiatal hernia repair.  Surgery admit; preop; needs neurology clearance; general anesthesia; stop  xarelto for 2 days prior to surgery; restart 2 days after surgery.      No orders of the defined types were placed in this encounter.      Sincerely,     Davion Zuniga MD         Discharge Summaries  - documented in this encounter      Martha Raymundo MD - 04/07/2022 1:00 PM CDT  Formatting of this note is different from the original.      HOSPITALIST DISCHARGE SUMMARY   [facility]     Admission Date: 4/5/2022  Discharge Date: 4/7/2022    Discharge Plan: Clyde Figueroa was discharged to home.    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 Clyde Figueroa is a 72 y.o. male with a history of Paroxysmal A fib- on DOAC, HTN, Hx of duodenal ulcers, Schatzki's ring s/p dilatation 06/2021, Whelan's esophagus, DM 2 was transferred from [facility] to [facility] on 4/5/2022 with gastric outlet obstruction and recent CVA.    Patient admitted to [facility] in [facility] 3/29/2022 for abdominal pain after trying to eat dinner. He was able to eat/drink normally prior to this onset of pain. Patient was found to have a near complete gastric outlet obstruction and was not able to take any of his medications including his anticoagulation. While awaiting transfer to his primary GI physician at [facility], patient had acute onset confusion On 4/2 and was found to  "have left posterior parietal CVA. Patient's confusion has since resolved and now with no notable motor or sensory neurological deficits. NG remains in place (had been clamped for 2 days) and patient had PICC line placed and was started on TPN. He was transferred to [facility] 4/5/2022 for further GI consultation.    GI consulted and pt had EGD with dilatation of duodenal stenosis on 4/5. Appeared benign. Needs follow up EGD in 2 months and continue PPI. NG was removed and. Pt tolerated a diet and TPN was stopped on 4/6. GI recommends low fiber diet.    Neurology was consulted given recent CVA. CVA presumed embolic given location and pt being of DOAC. Anticoagulation was held due to size of CVA. DOAC can be resumed on 4/8. PT, OT and Speech therapies consulted. Outpatient PT and OT ordered.    PICC line (LUE) was removed. Pt noted to have RUE swelling- c/w superficial thrombophlebitis (prior IV site). No US done. Supportive cares    Pt was dc home on 4/7    Recommendations for Outpatient Provider   PCP: Mo Hernandez MD     Recommendations for outpatient provider   Specific recommendations to be addressed at the follow up visit:  1. Hospital follow up for duodenal stenosis. S/P Dilatation on 4/5. Stenosis appeared benign. Diet as tolerated. Continue PPI. Pt will follow up with [facility] in 2 months.   2. Follow up embolic CVA. OK to resume DOAC on 4/8. Outpatient OT and PT ordered. Holding Lisinopril for permissive hypertension postop. Can resume at follow up appointment.  3. Follow up RUE superficial thrombophlebitis (prior peripheral IV)  4. Follow up diabetes mellitus. Holding Jardiance follow up to make sure pt is staying hydrated. Can resume at follow up if labs and PO intake stable  5. Follow up A Fib.    Medication regimen changes: see \"Hospital Course\" above.    Follow-up labs/imaging: BMP at post-hospital visit    Other specialty follow-up not included in DC orders: None    Special " considerations: none.    Functional evaluations:   Fall Risk: Total Score (If 5 or > is High Risk): 2 (04/07/22 0800)  NuDESC (>/=2 abnormal): 0 (04/07/22 0800)   MOCA: // SLUMS:         Discharge Medications       Your Home Medicines     CHANGE how you take these medicines   Instructions   empagliflozin 25 mg tablet  For diagnoses: Diabetes mellitus type 2 in nonobese (HC)  What changed: additional instructions  Commonly known as: Jardiance  Take 1 Tablet (25 mg) by mouth once daily. DO NOT TAKE UNTIL RESUMED BY YOUR PRIMARY MD    lisinopriL 10 mg tablet  For diagnoses: HTN (hypertension)  What changed: additional instructions  Commonly known as: PRINIVIL; ZESTRIL  Take 1 Tablet (10 mg) by mouth once daily. DO NOT TAKE UNTIL RESUMED BY YOUR PRIMARY MD    pantoprazole 40 mg delayed-release tablet  For diagnoses: Duodenal stenosis  What changed: when to take this  Commonly known as: Protonix  Take 1 Tablet (40 mg) by mouth 2 times daily before meals.    rivaroxaban 20 mg tablet  For diagnoses: Paroxysmal atrial fibrillation (HC)  What changed: additional instructions  Start taking on: April 8, 2022  Commonly known as: Xarelto  Take 1 Tablet (20 mg) by mouth once daily with evening meal. START ON Friday 4/8      CONTINUE taking these medicines   Instructions   artificial tears (peg 400-propylene glycol) 0.4-0.3 % Drop ophthalmic  Commonly known as: SYSTANE  Place 1-2 Drops into both eyes each time if needed for Dry Eyes.    Avodart 0.5 mg capsule  Generic drug: dutasteride  Every other day    cholecalciferol 2,000 unit capsule  Commonly known as: VITAMIN D3  Take 1 capsule by mouth once daily.    Fish OiL 1,200-360 mg Cap  Generic drug: fish oil-omega-3 fatty acids  Take 1 Capsule by mouth once daily. One capsule is 1200 mg-360 mg    lancets  For diagnoses: Diabetes mellitus type 2 in nonobese (HC)  Commonly known as: OneTouch UltraSoft Lancets    metFORMIN 500 mg Extended-Release tablet  Commonly known as: GLUCOPHAGE  "XR  Take 1,000 mg by mouth 2 times daily with meals.    metoprolol succinate 25 mg Sustained-Release tablet  Commonly known as: TOPROL XL  Take 25 mg by mouth once daily.    Needle (Disp) 18 G 18 gauge x 1\" Ndle  As directed. Use monthly for testosterone IM injection    OneTouch Ultra Test strip  Generic drug: blood sugar diagnostic    pregabalin 50 mg capsule  Commonly known as: LYRICA  Take 50 mg by mouth 3 times daily.    rOPINIRole 0.25 mg tablet  Commonly known as: REQUIP  Take 0.25 mg by mouth at bedtime.    simvastatin 40 mg tablet  Commonly known as: ZOCOR  TAKE 1 TABLET BY MOUTH EVERY DAY WITH EVENING MEAL    Syringe (Reusable) 2 mL Syrr  As directed. Monthly for depotestosterone injection    tamsulosin 0.4 mg capsule  Commonly known as: FLOMAX  Take 0.4 mg by mouth once every other day.    Trulicity 1.5 mg/0.5 mL subcutaneous pen  Generic drug: dulaglutide  Inject 1.5 mg subcutaneous once weekly.        Where to get your medicines     These medications were sent to [id number] - [address] AT [address] [phone] [address] [place] [id number]   Phone: [phone]     pantoprazole 40 mg delayed-release tablet    Prescriptions for these medicines or supplies were NOT printed nor sent to your preferred pharmacy. Check with your doctor if you have questions.   Check with your doctor if you have questions.    empagliflozin 25 mg tablet    lisinopriL 10 mg tablet    rivaroxaban 20 mg tablet      Pertinent Findings / Procedures   First weight: 76.1 kg (167 lb 11.2 oz) (04/05/22 0100) Last weight: 71.8 kg (158 lb 3.2 oz) (04/06/22 0551)     /96  Pulse 61  Temp 98.6  F (37  C)  Resp 18  Ht 1.854 m (6' 1\")  Wt 71.8 kg (158 lb 3.2 oz)  SpO2 95%  BMI 20.87 kg/m      Labs    Renal Heme GI     04/06/22  0617   SODIUM 140   POTASSIUM 4.6   CHLORIDE 106   VL2VHPQJ 26   ANIONGAP 8   BUN 21   CREATININE 0.83   GLUCOSE 256*   CALCIUM 8.8 "   MAGNESIUM 2.1     04/05/22  0554 04/06/22  0617   WBC 8.5 --   HGB 13.1* --   HCT 40.5 --   MCV 88 --   MCH 28.4 --   MCHC 32.3 --   RDW 12.4 --    254       04/05/22  0554   INR 1.1     04/05/22  0554   ALBUMIN 3.0*   BILITOTAL 1.4*   BILIDIRECT 0.6*   ALT <5*   AST 30   PROTEIN 5.9*   ALKPHOSPH 56       Cardiopulmonary ID Endo   COVID-19: Negative 4/5/2022 04/05/22  1804 04/05/22  2200 04/06/22  0251 04/06/22  0539 04/06/22  0813   GLUCOSEMETER 309* 397* 292* 251* 268*       Micro  none    Imaging  CT abd/pel 3/29/2022  1.  Evidence for focal narrowing or stenosis of the duodenum at the     level of the pancreatic head, which may be associated with underlying     occult neoplasm. Associated more proximal dilation of the stomach and     proximal portion of the duodenum.     XR gastrograffin 4/1/2022  1.  Redemonstration of markedly narrowed second portion of the duodenum   measuring approximately 2 cm in length located just distal to the distended   duodenal bulb which is seen to intermittently minimally dilate on fluoroscopy   allowing passage of small amount of oral contrast into the more distal portions   of the duodenum.    CT Head WO 4/2/2022  1.  Focal region of low attenuation and loss of the gray-white matter   distinction in the left posterior parietal lobe suspicious for acute ischemia in   the MCA vascular territory.   2. Mild generalized volume loss and supratentorial white matter changes commonly   seen with chronic microangiopathy.   3. No acute intracranial hemorrhage.     CTA Head 4/2/2022  1.  Asymmetrically attenuated M3 branches of the left middle cerebral artery in   the region of abnormal density on prior CT examination. No discrete focal   occlusive thrombus.   2. Patent anterior cerebral and posterior cerebral arteries bilaterally. Patent   right MCA.     MRI Head and Neck stroke protocol 4/3/2022  1. Approximately 5 cm region of ischemia in the left posterior parietal lobe.   2.  Moderate supratentorial white matter signal changes are nonspecific and   commonly seen with chronic microvascular disease.   3. Mild generalized age related parenchymal volume loss.     MRA Head and neck 4/3/2022  1.  Persistently attenuated distal left posterior parietal MCA branches.   Otherwise patent intracranial anterior and posterior circulation.     1. No MR evidence of hemodynamically significant cervical carotid or vertebral   stenosis, occlusion, or dissection.     TTE 4/4/2022   * Left ventricular systolic function is normal.     * The estimated ejection fraction is 60-65%.     * The left atrium is moderately enlarged. The biplane left atrial   end-systolic volume indexed to body surface area is 45 mL/m2..     * There is mild aortic regurgitation.     * There is moderate mitral regurgitation with multiple regurgitant jets..     * There is a small mobile echodensities within the left ventricular   cavity.   This likely represents part of the mitral valve chordal apparatus.     * Previous dobutamine stress echocardiogram performed on August 30, 2016,   mitral regurgitation has become more severe.     CXR 4/4/2022  1.  Well-positioned left upper extremity PICC line, tip projecting over the   atriocaval junction.     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  - Normal stomach.  - Acquired duodenal stenosis. Dilated.  - No specimens collected.    Recommendation:  - Return patient to [facility] for [facility] care.  - Full liquid diet today as tolerated.  - Continue PPI daily.  - Repeat EGD in 2 months for possible repeat dilation.  - Continue to avoid NSAIDs.    Consultants     GI  Neurology    Diet / Activity / Follow-Up     After Discharge Orders and Instructions   After Hospital Follow Up Appointment(s)   [facility] will call to arrange a repeat endoscopy with dilation in 2 months. Call with questions:  [phone]   When to follow up: 2 months   When is patient being discharged?: Tomorrow   Consistent Carbohydrate and Cardiac Diet:   LOW FIBER DIET PER GI   OT EVAL AND TREAT   PT EVAL AND TREAT   Primary Care Provider follow up appointment(s)   Mo Hernandez MD    When to follow up: 1 to 5 days   Up as tolerated   Get regular activity.Use walker for ambulation   What you may eat and drink after your hospital stay:   YOUR RECOMMENDED SELECTION FOR MEALS ARE:  A fiber-restricted diet contains less than 13 grams of fiber each day.   Eat:   - 5-6 small meals every 3-4 hours  - foods that are moist, easy to chew and easy to swallow  - soft, bland foods  - tender, well-cooked lean meats such as beef, fish, lamb, pork or poultry (chicken and turkey)  - soft canned fruits, melons and ripe bananas  - well-cooked, easy-to-cut (with a fork) vegetables.  Do not eat:   - pineapple  - whole grains  - seeds  - whole nuts  - vegetable and fruit peels or skins  - raw vegetables  - most raw fruits  - tough meats  - foods that are acidic, spicy, fried, greasy and high in fat  - foods that are hard, crunchy or sticky   When should you be concerned?   Your health care provider is: Mo Hernandez MD    Please call your health care provider if:    - you feel you are getting worse or having an increase in problems   - fever greater than 101 degrees  - increasing shortness of breath  - any signs of infection (increasing redness, swelling, tenderness, warmth, change in appearance, or increased drainage)  - blood in your urine or stool  - coughing or vomiting blood  - nausea (upset stomach) and vomiting and/or diarrhea that will not stop  - severe pain that is not relieved by medicine, rest or ice    Call 911 if you feel you are having a medical emergency.   Why were you at the hospital?   You were in the hospital for narrowing of your duodenum (small intestine) which caused gastric outlet obstruction and  inability to eat. You were transferred to [facility] for dilatation which was done on 4/5. You will follow up with [facility] in 2 months. While you were at the other hospital you had a stroke which is likely due a clot from Atrial fibrillation and being off Xarelto blood thinner. Neurology and therapies were consulted. Outpatient PT (physical therapy) and OT (Occupational therapy) has been ordered. Due to the size of the stroke we have not been giving you Xarelto. You can resume on 4/8.       Pending Studies     Lab results that may not be resulted at time of discharge: (From admission through now)   None       Total time spent on discharge coordination: 60 minutes. Patient was seen and examined today.    Martha Raymundo MD  Internal Medicine/ [facility] Hospitalist Service    Electronically signed by Martha Raymundo MD at 04/08/2022 8:15 AM CDT      Medications at Time of Discharge  - documented as of this encounter    Medications at Time of Discharge  Medication Sig Dispensed Refills Start Date End Date   artificial tears, peg 400-propylene glycol, (SYSTANE) 0.4-0.3 % drop ophthalmic   Place 1-2 Drops into both eyes each time if needed for Dry Eyes.   0       AVODART 0.5 mg capsule   Every other day   0 07/01/2013     cholecalciferol (VITAMIN D3) 2,000 unit capsule   Take 1 capsule by mouth once daily.   0 09/27/2013     dulaglutide (Trulicity) 1.5 mg/0.5 mL subcutaneous pen   Inject 1.5 mg subcutaneous once weekly.   0       empagliflozin (Jardiance) 25 mg tablet    Indications: Diabetes mellitus type 2 in nonobese (HC) Take 1 Tablet (25 mg) by mouth once daily. DO NOT TAKE UNTIL RESUMED BY YOUR PRIMARY MD   0 04/07/2022     fish oil-omega-3 fatty acids (Fish OiL) 1,200-360 mg cap   Take 1 Capsule by mouth once daily. One capsule is 1200 mg-360 mg   0       lancets (ONE TOUCH ULTRASOFT LANCETS)    Indications: Diabetes mellitus type 2 in nonobese (HC)   100 Each   2 12/24/2013     lisinopriL (PRINIVIL; ZESTRIL)  "10 mg tablet    Indications: HTN (hypertension) Take 1 Tablet (10 mg) by mouth once daily. DO NOT TAKE UNTIL RESUMED BY YOUR PRIMARY MD   0 04/07/2022     metFORMIN (GLUCOPHAGE XR) 500 mg Extended-Release tablet   Take 1,000 mg by mouth 2 times daily with meals.   0       metoprolol succinate (TOPROL XL) 25 mg Sustained-Release tablet   Take 25 mg by mouth once daily.   0       Needle, Disp, 18 G 18 x 1 \" ndle   As directed. Use monthly for testosterone IM injection 12 Each   0 12/24/2013     ONE TOUCH ULTRA TEST strip       0 09/19/2013     pantoprazole (Protonix) 40 mg delayed-release tablet    Indications: Duodenal stenosis Take 1 Tablet (40 mg) by mouth 2 times daily before meals. 60 Tablet   0 04/06/2022     pregabalin (LYRICA) 50 mg capsule   Take 50 mg by mouth 3 times daily.   0       rivaroxaban (Xarelto) 20 mg tablet    Indications: Paroxysmal atrial fibrillation (HC) Take 1 Tablet (20 mg) by mouth once daily with evening meal. START ON Friday 4/8   0 04/08/2022     rOPINIRole (REQUIP) 0.25 mg tablet   Take 0.25 mg by mouth at bedtime.   0       simvastatin (ZOCOR) 40 mg tablet   TAKE 1 TABLET BY MOUTH EVERY DAY WITH EVENING MEAL 30 tablet   0 06/01/2014     Syringe, Reusable, 2 mL syrr   As directed. Monthly for depotestosterone injection 12 Syringe   0 12/24/2013     tamsulosin (FLOMAX) 0.4 mg capsule   Take 0.4 mg by mouth once every other day.   0         Ordered Prescriptions  - documented in this encounter  Reconcile with Patient's Chart    Ordered Prescriptions  Prescription Sig Dispensed Refills Start Date End Date   empagliflozin (Jardiance) 25 mg tablet    Indications: Diabetes mellitus type 2 in nonobese (HC) Take 1 Tablet (25 mg) by mouth once daily. DO NOT TAKE UNTIL RESUMED BY YOUR PRIMARY MD   0 04/07/2022     lisinopriL (PRINIVIL; ZESTRIL) 10 mg tablet    Indications: HTN (hypertension) Take 1 Tablet (10 mg) by mouth once daily. DO NOT TAKE UNTIL RESUMED BY YOUR PRIMARY MD   0 04/07/2022   "   rivaroxaban (Xarelto) 20 mg tablet    Indications: Paroxysmal atrial fibrillation (HC) Take 1 Tablet (20 mg) by mouth once daily with evening meal. START ON Friday 4/8   0 04/08/2022     pantoprazole (Protonix) 40 mg delayed-release tablet    Indications: Duodenal stenosis Take 1 Tablet (40 mg) by mouth 2 times daily before meals. 60 Tablet   0 04/06/2022       Discharge Disposition  - documented in this encounter    Discharge Disposition  Disposition Code Departure Means Destination   Home Self Care           Progress Notes  - documented in this encounter    Table of Contents for Progress Notes   She Marcial, RN - 04/07/2022 3:03 PM CDT   Jennifer Padgett, RN - 04/07/2022 1:56 PM CDT   Cassie Tate NP - 04/07/2022 6:15 AM CDT   Sergey Gar RN - 04/07/2022 3:22 AM CDT   Sergey Gar RN - 04/06/2022 10:34 PM CDT   Pearl Vegas, RN - 04/06/2022 10:28 PM CDT   Carmen Blevins NP - 04/06/2022 3:59 PM CDT   Shanna Clemens, WANDA - 04/06/2022 2:20 PM CDT   Martha Raymundo MD - 04/06/2022 1:16 PM CDT   Cassie Tate NP - 04/06/2022 11:59 AM CDT   Mo Cade, PharmD - 04/06/2022 9:01 AM CDT   Bailey Dawkins RN - 04/06/2022 6:41 AM CDT   Em Doyle RN - 04/05/2022 7:40 PM CDT   Pearl Vegas, RN - 04/05/2022 7:15 PM CDT   Shanna Clemens RN - 04/05/2022 3:32 PM CDT   Marii Lemon, [facility] - 04/05/2022 12:10 PM CDT   Meghana Mclean RD, [facility] - 04/05/2022 8:57 AM CDT   Marii Lemon, [facility] - 04/05/2022 7:59 AM CDT   Marycruz Tineo, RN - 04/05/2022 5:00 AM CDT   Carrie Palacios RN - 04/05/2022 1:28 AM CDT        She Marcial RN - 04/07/2022 3:03 PM CDT  Formatting of this note might be different from the original.  Discharge Note    Data:  Patient has been discharged home with a family/friend. Family/friend's name wife at 1500 via wheelchair accompanied by Registered Nurse.     Action:    Written discharge/follow-up instructions were provided to patient and  Spouse.    Prescriptions will resume previous medications.     Belongings and medications from home sent with patient.    Home equipment: none     Patient is being discharged with the following new lines and drains: None.    Is patient being discharged with a wound, dressing, or incision site? No.    DISCHARGE CONTACT INFORMATION  Phone number to reach the patient within the next 72 hours after discharge?: (not recorded)  May staff leave a detailed message at this number? : (not recorded)    Response:  Patient verbalized understanding of discharge instructions, reason for discharge, and necessary follow-up appointments.        Electronically signed by She Marcial RN at 04/07/2022 3:04 PM CDT

## 2022-05-05 NOTE — LETTER
5/5/2022       RE: Clyde Figueroa  [id number] [facility] [address]     Dear Colleague,    Thank you for referring your patient, Clyde Figueroa, to the [facility] [place] at [facility]. Please see a copy of my visit note below.        New General Surgery Consultation Note        Clyde Figueroa  [id number]  1950  May 5, 2022        Dear Dr Turpin,     I had the pleasure of seeing your patient, Clyde Figueroa.  He is a 72 year old male who is being seen in consultation for the following concern(s).     CHIEF COMPLAINT:  History of annular pancreas.  Foregut GI complaints.    HISTORY OF PRESENT ILLNESS:  For the past 10-15 yrs Mr. Figueroa has had dysphagia with difficulty swallowing; this has been associated with other GI discomforts.  He has had numerous endoscopies and there have been several findings, including Schatzki's ring, hiatal hernia, and obstruction of 2nd portion of duodenum.    He has also had CT scan of abdomen/pelvis which documents presence of annular pancreas.    I spoke with him at some length about his symptoms; these tend to be of esophageal origin location, but this cannot be discerned specifically from symptoms attributable to the duodenal obstruction.    Dr. Dye from GI has recommended that I consider duodenojejunostomy bypass for Mr. Figueroa.   This would be laparoscopic.  After further discussing symptoms, we agreed together that hiatal hernia repair should be performed as well with gastropexy.      I note that he had stroke 1 month ago related to discontinued xarelto usage.  He will need to have neurology clearance prior to foregut surgery.    DURATION:  At least 10 yrs; he remembers specifically having no issues 20+ yrs ago and certainly no swallowing or GI problems when he was in his 20s or earlier.     MODIFYING FACTORS:  No flowsheet data found.    ASSOCIATED SIGNS/SYMPTOMS:   reflux.        Most recent Upper Endoscopy:  Maciej Miguel MD - 04/05/2022  4:08 PM CDT   Formatting of this note might be different from the original.   [facility]   _______________________________________________________________________________   Patient Name: Clyde Figueroa          Procedure Date: 4/5/2022   MRN: [id number]                       Gender: Male   Account Number: [id number]             YOB: 1950   Admit Type: Inpatient                   _______________________________________________________________________________     Procedure:                    Upper GI endoscopy   Proceduralist:                Maciej Miguel MD - [place]                                 [facility]   Indications/Pre-Op Diagnosis: Stenosis of the duodenum leading to outlet                                 obstruction   Medications:                  General Anesthesia     Procedure Description:        Risk of bleeding, infection, perforation, need for surgery and        alternatives discussed.        The gastroscope was introduced through the mouth, and advanced to the        third part of duodenum. The upper GI endoscopy was accomplished without        difficulty. The patient tolerated the procedure well.     Complications:                No immediate complications.   Estimated Blood Loss & Specimen:        Estimated blood loss: none.        Specimen collected: None     Findings:        There were esophageal mucosal changes secondary to established        short-segment Whelan's disease present at the gastroesophageal        junction. The maximum longitudinal extent of these mucosal changes was 1        cm in length.        The exam of the esophagus was otherwise normal.        The entire examined stomach was normal.        An acquired benign-appearing, intrinsic severe stenosis was found in the        second portion of the duodenum and was traversed after dilation. A TTS         dilator was passed through the scope. Dilation with a 10-11-12 mm        balloon dilator was performed to 12 mm. The dilation site was examined        and showed mild mucosal disruption.        The exam of the duodenum was otherwise normal.     Impressions/Post-Op Diagnosis:        - Esophageal mucosal changes secondary to established short-segment        Whelan's disease.        - Normal stomach.        - Acquired duodenal stenosis. Dilated.        - No specimens collected.     Recommendation:        - Return patient to [facility] for [facility] care.        - Full liquid diet today as tolerated.        - Continue PPI daily.        - Repeat EGD in 2 months for possible repeat dilation.        - Continue to avoid NSAIDs.         ___________________   Maciej Miguel MD   4/5/2022 4:07:54 PM   This report has been signed electronically.     Note Initiated On: 4/5/2022 3:16 PM  Specimen Collected: 04/05/22  3:16 PM Last Resulted: 04/05/22  3:16 PM   Received From: [facility]  Result Received: 04/21/22  3:44 PM         Prior UGI study from April 2022:  XR GASTROGRAFIN UPPER GI    Anatomical Region Laterality Modality   Esophagus, stomach and duodenum -- Other       Narrative    -------------------------------- Original Report   -------------------------------     EXAM:  UGI WITH GASTROVIEW, SINGLE CONTRAST     COMPARISON:  CT abdomen pelvis 3/29/2022.     CLINICAL INFORMATION: 72 year old Male presents with abdominal pain.   CT abdomen pelvis 3/9/2022 showed focal narrowing or stenosis of the duodenum at   the level of the pancreatic head, which may be associated with underlying occult   neoplasm. Associated more proximal dilation of the stomach and proximal portion   of the duodenum.        TECHNICAL INFORMATION:  Fluoroscopic and radiographic examination of the   stomach, duodenum and proximal jejunum were performed.     INTERPRETATION:      film shows NG tube in place with  unremarkable bowel gas pattern.     Gastrografin and water were administered via the NG tube.     There was prompt opacification of the nondistended stomach exhibiting normal   rugal folds and no suspicious saccular outpouching.       There was slow flow of oral contrast from the stomach to the more distal   portions of the duodenum.     There is redemonstration of marked narrowing of the second portion of the   duodenum measuring approximately 2 cm in length located just distal to the   distended duodenal bulb.  This short segment of narrowing is seen to   intermittently minimally dilate on fluoroscopy allowing passage of small amount   of oral contrast into the more distal portions of the duodenum.     Fluoroscopy time: 27 seconds   Fluoroscopy dose: 91.4 mGy   Fluoroscopy images: 55     Gastrografin: 100 mL   Water: 50 mL       IMPRESSION:     1.  Redemonstration of markedly narrowed second portion of the duodenum   measuring approximately 2 cm in length located just distal to the distended   duodenal bulb which is seen to intermittently minimally dilate on fluoroscopy   allowing passage of small amount of oral contrast into the more distal portions   of the duodenum.             Read by: Romel Kellogg M.D.       Prior MRI from 2015:  Pancreas: Annular pancreas encircling the second portion of the  duodenum with associated proximal 9 dilatation of the duodenal bulb,  similar from the prior study. Preserved T1 increased signal throughout  the pancreatic parenchyma. No focal lesions. No ductal dilatation. No  peripancreatic inflammatory changes. Also is normal variant  duplication of the pancreatic duct in the tail (as seen on series 5  images 11 through 13).     Bowel: Duodenal bulb mild dilatation as aforementioned. Otherwise, no  bowel loops dilatation. No bowel wall thickening.      Prior endoscopy June 2021:  Findings:        A non-obstructing and mild Schatzki ring was found at the        gastroesophageal  junction. A guidewire was placed and the scope was        withdrawn. Dilation was performed with a Savary dilator with no        resistance at 18 mm.        The Z-line was irregular and was found 40 to 41 cm from the incisors.        Biopsies were taken with a cold forceps for histology.        A medium amount of food (residue) was found in the gastric body.        The pylorus was normal.        The cardia and gastric fundus were normal on retroflexion.        Food (residue) was found in the duodenal bulb.                                                                                     Impression:          - Non-obstructing and mild Schatzki ring. Dilated to 18                        mm with Savary dilator. Unclear if this is the source of                        recent symptoms (which sounded suggestive of food                        impaction, now resolved). No other potential source of                        dysphagia identified. No mass, stricture or esophagitis.                        - Z-line irregular, 40 to 41 cm from the incisors.                        Biopsied given prior history of Whelan's. Previous                        biopsies were more suggestive of intestinal metaplasia                        of the cardia.                        - A medium amount of food (residue) in the stomach.                        - Normal pylorus.                        - Retained food in the duodenum.                        - Unable to pass from bulb to second portion, partly due                        to food. Pt has a known history of annular pancreatic                        which may be contributing to food retention. Current                        symptoms do not sound suggestive of gastric outlet                        obstructive symptoms.   Recommendation:      - Discharge patient to home (ambulatory).                        - Observe patient's clinical course following today's                        procedure  with therapeutic intervention.                        - Await pathology results. If no dysplasia, I would not                        recommend further Whelan's surveillance in light of his                        age and ultrashort segment Whelan's (if truly                        Whelan's).                        If symptoms persist, recommend referral to luminal GI                        clinic for further evaluation, possibly including                        manometry and esophagram vs UGI series.                        If symptoms were more consistent with duodenal                        obstruction from the annular pancreas, this would                        typically be treated with gastrojejunostomy to bypass                        the obstruction. It is not clear at this time that this                        is indicate.                                                                                       Electronically signed by Dr. Kamaljit Dye   ____________________   Darren Dye MD   6/30/2021 11:30:37 AM             ROS    PAST MEDICAL HISTORY:  Past Medical History:   Diagnosis Date     Diabetes mellitus (H)      Gastro-oesophageal reflux disease      History of blood transfusion      Hypertension      Pancreatic disease         PAST SURGICAL HISTORY:  Past Surgical History:   Procedure Laterality Date     APPENDECTOMY       ARTHROSCOPY SHOULDER ROTATOR CUFF REPAIR       ENDOSCOPIC RETROGRADE CHOLANGIOPANCREATOGRAM  5/14/2013    Procedure: ENDOSCOPIC RETROGRADE CHOLANGIOPANCREATOGRAM;  Endoscopic retrograde cholangiopancreatography. Balloon Dilation of Duodenal Stricture with  Endogastric Duodenoscopy;  Surgeon: Jhony Khan MD;  Location: [facility] OR     ENDOSCOPIC ULTRASOUND UPPER GASTROINTESTINAL TRACT (GI) N/A 7/30/2015    Procedure: ENDOSCOPIC ULTRASOUND, ESOPHAGOSCOPY / UPPER GASTROINTESTINAL TRACT (GI);  Surgeon: Darren Dye MD;  Location: [facility]     ESOPHAGOSCOPY,  GASTROSCOPY, DUODENOSCOPY (EGD), COMBINED  4/10/2013    Procedure: COMBINED ESOPHAGOSCOPY, GASTROSCOPY, DUODENOSCOPY (EGD), BIOPSY SINGLE OR MULTIPLE;;  Surgeon: Bright Carl MD;  Location: [facility] GI     ESOPHAGOSCOPY, GASTROSCOPY, DUODENOSCOPY (EGD), COMBINED  4/30/2013    Procedure: COMBINED ESOPHAGOSCOPY, GASTROSCOPY, DUODENOSCOPY (EGD);;  Surgeon: Darren Mejía MD;  Location: [facility] GI     ESOPHAGOSCOPY, GASTROSCOPY, DUODENOSCOPY (EGD), COMBINED  5/28/2013    Procedure: COMBINED ESOPHAGOSCOPY, GASTROSCOPY, DUODENOSCOPY (EGD);  Upper Endoscopy with Balloon Dilation *Latex Safe*;  Surgeon: Jhony Khan MD;  Location: [facility]     ESOPHAGOSCOPY, GASTROSCOPY, DUODENOSCOPY (EGD), COMBINED  6/18/2013    Procedure: COMBINED ESOPHAGOSCOPY, GASTROSCOPY, DUODENOSCOPY (EGD);  Upper Endoscopy with esophageal dilation;  Surgeon: Jhony Khan MD;  Location: [facility]     ESOPHAGOSCOPY, GASTROSCOPY, DUODENOSCOPY (EGD), COMBINED N/A 4/4/2015    Procedure: COMBINED ESOPHAGOSCOPY, GASTROSCOPY, DUODENOSCOPY (EGD);  Surgeon: Rodney Montesinos MD;  Location: [facility]     ESOPHAGOSCOPY, GASTROSCOPY, DUODENOSCOPY (EGD), COMBINED Left 7/15/2015    Procedure: COMBINED ESOPHAGOSCOPY, GASTROSCOPY, DUODENOSCOPY (EGD), BIOPSY SINGLE OR MULTIPLE;  Surgeon: Rodney Montesinos MD;  Location: [facility]     ESOPHAGOSCOPY, GASTROSCOPY, DUODENOSCOPY (EGD), COMBINED N/A 8/8/2018    Procedure: COMBINED ESOPHAGOSCOPY, GASTROSCOPY, DUODENOSCOPY (EGD), BIOPSY SINGLE OR MULTIPLE;  EGD;  Surgeon: Darren Dye MD;  Location: [facility]     ESOPHAGOSCOPY, GASTROSCOPY, DUODENOSCOPY (EGD), COMBINED N/A 6/30/2021    Procedure: ESOPHAGOGASTRODUODENOSCOPY, WITH BIOPSY;  Surgeon: Darren Dye MD;  Location: [facility]     HERNIA REPAIR          MEDICATIONS:  Current Outpatient Medications   Medication     ASPIRIN ADULT LOW STRENGTH PO     blood glucose (NO BRAND SPECIFIED) test strip     carbidopa-levodopa (SINEMET CR)   MG CR tablet     CONTOUR NEXT TEST test strip     dulaglutide (TRULICITY) 1.5 MG/0.5ML pen     dutasteride (AVODART) 0.5 MG capsule     empagliflozin (JARDIANCE) 25 MG TABS tablet     finasteride (PROSCAR) 5 MG tablet     fish oil-omega-3 fatty acids 1000 MG capsule     GLIMEPIRIDE PO     LISINOPRIL PO     metFORMIN (GLUCOPHAGE) 500 MG/5ML SOLN     metoprolol (LOPRESSOR) 25 MG tablet     Microlet Lancets MISC     pantoprazole (PROTONIX) 40 MG enteric coated tablet     ROPINIROLE HCL PO     simvastatin (ZOCOR) 40 MG tablet     tamsulosin (FLOMAX) 0.4 MG capsule     testosterone (ANDROGEL/TESTIM) 50 MG/5GM (1%) gel     VITAMIN D, CHOLECALCIFEROL, PO     XARELTO ANTICOAGULANT 20 MG TABS tablet     No current facility-administered medications for this visit.        ALLERGIES:  No Known Allergies     SOCIAL HISTORY:  Social History     Socioeconomic History     Marital status: Single   Tobacco Use     Smoking status: Never Smoker   Substance and Sexual Activity     Alcohol use: Yes     Comment: Rare use     Drug use: No   Social History Narrative    Retired, former        FAMILY HISTORY:  Family History   Problem Relation Age of Onset     Cardiovascular Father      Peptic Ulcer Disease Mother         PHYSICAL EXAM:  Vital Signs: /62 (BP Location: Left arm, Patient Position: Sitting, Cuff Size: Adult Regular)   Pulse 69   Ht 1.829 m (6')   Wt 72.7 kg (160 lb 3.2 oz)   SpO2 99%   BMI 21.73 kg/m    HEENT: NCAT; MMM;   Lungs: Breathing unlabored  Abdomen: soft, nontender, without hepatosplenomegaly or masses     PHYSICAL EXAM AREA OF INTEREST:  No distension     PERTINENT IMAGING/TESTING:  See hpi    LABORATORY TESTING:  None more needed    ASSESSMENT:   Clyde Figueroa is a 72 year old male with annular pancreas; no prior treatment.     DISCUSSION OF RISKS:  I reviewed the risks of surgery with Clyde Figueroa.    These include, but are not limited to, death, myocardial infarction, pneumonia,  urinary tract infection, deep venous thrombosis with or without pulmonary embolus, abdominal infection from bowel injury or abscess, bowel obstruction, wound infection, and bleeding.    PLAN:   Laparoscopic duodenojejunostomy; hiatal hernia repair.  Surgery admit; preop; needs neurology clearance; general anesthesia; stop xarelto for 2 days prior to surgery; restart 2 days after surgery.      No orders of the defined types were placed in this encounter.      Sincerely,     Davion Zuniga MD         Discharge Summaries  - documented in this encounter      Martha Raymundo MD - 04/07/2022 1:00 PM CDT  Formatting of this note is different from the original.      HOSPITALIST DISCHARGE SUMMARY   [facility]     Admission Date: 4/5/2022  Discharge Date: 4/7/2022    Discharge Plan: Clyde Figueroa was discharged to home.    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 Clyde Figueroa is a 72 y.o. male with a history of Paroxysmal A fib- on DOAC, HTN, Hx of duodenal ulcers, Schatzki's ring s/p dilatation 06/2021, Whelan's esophagus, DM 2 was transferred from [facility] to [facility] on 4/5/2022 with gastric outlet obstruction and recent CVA.    Patient admitted to [facility] in [facility] 3/29/2022 for abdominal pain after trying to eat dinner. He was able  to eat/drink normally prior to this onset of pain. Patient was found to have a near complete gastric outlet obstruction and was not able to take any of his medications including his anticoagulation. While awaiting transfer to his primary GI physician at [facility], patient had acute onset confusion On 4/2 and was found to have left posterior parietal CVA. Patient's confusion has since resolved and now with no notable motor or sensory neurological deficits. NG remains in place (had been clamped for 2 days) and patient had PICC line placed and was started on TPN. He was transferred to [facility] 4/5/2022 for further GI consultation.    GI consulted and pt had EGD with dilatation of duodenal stenosis on 4/5. Appeared benign. Needs follow up EGD in 2 months and continue PPI. NG was removed and. Pt tolerated a diet and TPN was stopped on 4/6. GI recommends low fiber diet.    Neurology was consulted given recent CVA. CVA presumed embolic given location and pt being of DOAC. Anticoagulation was held due to size of CVA. DOAC can be resumed on 4/8. PT, OT and Speech therapies consulted. Outpatient PT and OT ordered.    PICC line (LUE) was removed. Pt noted to have RUE swelling- c/w superficial thrombophlebitis (prior IV site). No US done. Supportive cares    Pt was dc home on 4/7    Recommendations for Outpatient Provider   PCP: Mo Hernandez MD     Recommendations for outpatient provider   Specific recommendations to be addressed at the follow up visit:  1. Hospital follow up for duodenal stenosis. S/P Dilatation on 4/5. Stenosis appeared benign. Diet as tolerated. Continue PPI. Pt will follow up with [facility] in 2 months.   2. Follow up embolic CVA. OK to resume DOAC on 4/8. Outpatient OT and PT ordered. Holding Lisinopril for permissive hypertension postop. Can resume at follow up appointment.  3. Follow up RUE superficial thrombophlebitis (prior peripheral IV)  4. Follow up diabetes mellitus. Holding  "Jardiance follow up to make sure pt is staying hydrated. Can resume at follow up if labs and PO intake stable  5. Follow up A Fib.    Medication regimen changes: see \"Hospital Course\" above.    Follow-up labs/imaging: BMP at post-hospital visit    Other specialty follow-up not included in DC orders: None    Special considerations: none.    Functional evaluations:   Fall Risk: Total Score (If 5 or > is High Risk): 2 (04/07/22 0800)  NuDESC (>/=2 abnormal): 0 (04/07/22 0800)   MOCA: // SLUMS:         Discharge Medications       Your Home Medicines     CHANGE how you take these medicines   Instructions   empagliflozin 25 mg tablet  For diagnoses: Diabetes mellitus type 2 in nonobese (HC)  What changed: additional instructions  Commonly known as: Jardiance  Take 1 Tablet (25 mg) by mouth once daily. DO NOT TAKE UNTIL RESUMED BY YOUR PRIMARY MD    lisinopriL 10 mg tablet  For diagnoses: HTN (hypertension)  What changed: additional instructions  Commonly known as: PRINIVIL; ZESTRIL  Take 1 Tablet (10 mg) by mouth once daily. DO NOT TAKE UNTIL RESUMED BY YOUR PRIMARY MD    pantoprazole 40 mg delayed-release tablet  For diagnoses: Duodenal stenosis  What changed: when to take this  Commonly known as: Protonix  Take 1 Tablet (40 mg) by mouth 2 times daily before meals.    rivaroxaban 20 mg tablet  For diagnoses: Paroxysmal atrial fibrillation (HC)  What changed: additional instructions  Start taking on: April 8, 2022  Commonly known as: Xarelto  Take 1 Tablet (20 mg) by mouth once daily with evening meal. START ON Friday 4/8      CONTINUE taking these medicines   Instructions   artificial tears (peg 400-propylene glycol) 0.4-0.3 % Drop ophthalmic  Commonly known as: SYSTANE  Place 1-2 Drops into both eyes each time if needed for Dry Eyes.    Avodart 0.5 mg capsule  Generic drug: dutasteride  Every other day    cholecalciferol 2,000 unit capsule  Commonly known as: VITAMIN D3  Take 1 capsule by mouth once daily.    Fish OiL " "1,200-360 mg Cap  Generic drug: fish oil-omega-3 fatty acids  Take 1 Capsule by mouth once daily. One capsule is 1200 mg-360 mg    lancets  For diagnoses: Diabetes mellitus type 2 in nonobese (HC)  Commonly known as: OneTouch UltraSoft Lancets    metFORMIN 500 mg Extended-Release tablet  Commonly known as: GLUCOPHAGE XR  Take 1,000 mg by mouth 2 times daily with meals.    metoprolol succinate 25 mg Sustained-Release tablet  Commonly known as: TOPROL XL  Take 25 mg by mouth once daily.    Needle (Disp) 18 G 18 gauge x 1\" Ndle  As directed. Use monthly for testosterone IM injection    OneTouch Ultra Test strip  Generic drug: blood sugar diagnostic    pregabalin 50 mg capsule  Commonly known as: LYRICA  Take 50 mg by mouth 3 times daily.    rOPINIRole 0.25 mg tablet  Commonly known as: REQUIP  Take 0.25 mg by mouth at bedtime.    simvastatin 40 mg tablet  Commonly known as: ZOCOR  TAKE 1 TABLET BY MOUTH EVERY DAY WITH EVENING MEAL    Syringe (Reusable) 2 mL Syrr  As directed. Monthly for depotestosterone injection    tamsulosin 0.4 mg capsule  Commonly known as: FLOMAX  Take 0.4 mg by mouth once every other day.    Trulicity 1.5 mg/0.5 mL subcutaneous pen  Generic drug: dulaglutide  Inject 1.5 mg subcutaneous once weekly.        Where to get your medicines     These medications were sent to [facility][id number] - [address] AT [address] [phone] [address] [place] [id number]   Phone: [phone]     pantoprazole 40 mg delayed-release tablet    Prescriptions for these medicines or supplies were NOT printed nor sent to your preferred pharmacy. Check with your doctor if you have questions.   Check with your doctor if you have questions.    empagliflozin 25 mg tablet    lisinopriL 10 mg tablet    rivaroxaban 20 mg tablet      Pertinent Findings / Procedures   First weight: 76.1 kg (167 lb 11.2 oz) (04/05/22 0100) Last weight: 71.8 kg (158 lb 3.2 oz) (04/06/22 " "0551)     /96  Pulse 61  Temp 98.6  F (37  C)  Resp 18  Ht 1.854 m (6' 1\")  Wt 71.8 kg (158 lb 3.2 oz)  SpO2 95%  BMI 20.87 kg/m      Labs    Renal Heme GI     04/06/22  0617   SODIUM 140   POTASSIUM 4.6   CHLORIDE 106   IC6PWSRC 26   ANIONGAP 8   BUN 21   CREATININE 0.83   GLUCOSE 256*   CALCIUM 8.8   MAGNESIUM 2.1     04/05/22  0554 04/06/22  0617   WBC 8.5 --   HGB 13.1* --   HCT 40.5 --   MCV 88 --   MCH 28.4 --   MCHC 32.3 --   RDW 12.4 --    254       04/05/22  0554   INR 1.1     04/05/22  0554   ALBUMIN 3.0*   BILITOTAL 1.4*   BILIDIRECT 0.6*   ALT <5*   AST 30   PROTEIN 5.9*   ALKPHOSPH 56       Cardiopulmonary ID Endo   COVID-19: Negative 4/5/2022 04/05/22  1804 04/05/22  2200 04/06/22  0251 04/06/22  0539 04/06/22  0813   GLUCOSEMETER 309* 397* 292* 251* 268*       Micro  none    Imaging  CT abd/pel 3/29/2022  1.  Evidence for focal narrowing or stenosis of the duodenum at the     level of the pancreatic head, which may be associated with underlying     occult neoplasm. Associated more proximal dilation of the stomach and     proximal portion of the duodenum.     XR gastrograffin 4/1/2022  1.  Redemonstration of markedly narrowed second portion of the duodenum   measuring approximately 2 cm in length located just distal to the distended   duodenal bulb which is seen to intermittently minimally dilate on fluoroscopy   allowing passage of small amount of oral contrast into the more distal portions   of the duodenum.    CT Head WO 4/2/2022  1.  Focal region of low attenuation and loss of the gray-white matter   distinction in the left posterior parietal lobe suspicious for acute ischemia in   the MCA vascular territory.   2. Mild generalized volume loss and supratentorial white matter changes commonly   seen with chronic microangiopathy.   3. No acute intracranial hemorrhage.     CTA Head 4/2/2022  1.  Asymmetrically attenuated M3 branches of the left middle cerebral artery in   the " region of abnormal density on prior CT examination. No discrete focal   occlusive thrombus.   2. Patent anterior cerebral and posterior cerebral arteries bilaterally. Patent   right MCA.     MRI Head and Neck stroke protocol 4/3/2022  1. Approximately 5 cm region of ischemia in the left posterior parietal lobe.   2. Moderate supratentorial white matter signal changes are nonspecific and   commonly seen with chronic microvascular disease.   3. Mild generalized age related parenchymal volume loss.     MRA Head and neck 4/3/2022  1.  Persistently attenuated distal left posterior parietal MCA branches.   Otherwise patent intracranial anterior and posterior circulation.     1. No MR evidence of hemodynamically significant cervical carotid or vertebral   stenosis, occlusion, or dissection.     TTE 4/4/2022   * Left ventricular systolic function is normal.     * The estimated ejection fraction is 60-65%.     * The left atrium is moderately enlarged. The biplane left atrial   end-systolic volume indexed to body surface area is 45 mL/m2..     * There is mild aortic regurgitation.     * There is moderate mitral regurgitation with multiple regurgitant jets..     * There is a small mobile echodensities within the left ventricular   cavity.   This likely represents part of the mitral valve chordal apparatus.     * Previous dobutamine stress echocardiogram performed on August 30, 2016,   mitral regurgitation has become more severe.     CXR 4/4/2022  1.  Well-positioned left upper extremity PICC line, tip projecting over the   atriocaval junction.     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  - Normal stomach.  - Acquired duodenal stenosis. Dilated.  - No specimens collected.    Recommendation:  - Return patient to hospital [facility] for ongoing care.  - Full liquid diet today as tolerated.  - Continue PPI daily.  - Repeat EGD in 2 months for  possible repeat dilation.  - Continue to avoid NSAIDs.    Consultants     GI  Neurology    Diet / Activity / Follow-Up     After Discharge Orders and Instructions   After Hospital Follow Up Appointment(s)   [facility] will call to arrange a repeat endoscopy with dilation in 2 months. Call with questions: [phone]   When to follow up: 2 months   When is patient being discharged?: Tomorrow   Consistent Carbohydrate and Cardiac Diet:   LOW FIBER DIET PER GI   OT EVAL AND TREAT   PT EVAL AND TREAT   Primary Care Provider follow up appointment(s)   Mo Hernandez MD    When to follow up: 1 to 5 days   Up as tolerated   Get regular activity.Use walker for ambulation   What you may eat and drink after your hospital stay:   YOUR RECOMMENDED SELECTION FOR MEALS ARE:  A fiber-restricted diet contains less than 13 grams of fiber each day.   Eat:   - 5-6 small meals every 3-4 hours  - foods that are moist, easy to chew and easy to swallow  - soft, bland foods  - tender, well-cooked lean meats such as beef, fish, lamb, pork or poultry (chicken and turkey)  - soft canned fruits, melons and ripe bananas  - well-cooked, easy-to-cut (with a fork) vegetables.  Do not eat:   - pineapple  - whole grains  - seeds  - whole nuts  - vegetable and fruit peels or skins  - raw vegetables  - most raw fruits  - tough meats  - foods that are acidic, spicy, fried, greasy and high in fat  - foods that are hard, crunchy or sticky   When should you be concerned?   Your health care provider is: Mo Hernandez MD    Please call your health care provider if:    - you feel you are getting worse or having an increase in problems   - fever greater than 101 degrees  - increasing shortness of breath  - any signs of infection (increasing redness, swelling, tenderness, warmth, change in appearance, or increased drainage)  - blood in your urine or stool  - coughing or vomiting blood  - nausea (upset stomach) and  vomiting and/or diarrhea that will not stop  - severe pain that is not relieved by medicine, rest or ice    Call 911 if you feel you are having a medical emergency.   Why were you at the hospital?   You were in the hospital for narrowing of your duodenum (small intestine) which caused gastric outlet obstruction and inability to eat. You were transferred to [facility] for dilatation which was done on 4/5. You will follow up with [facility] in 2 months. While you were at the other hospital you had a stroke which is likely due a clot from Atrial fibrillation and being off Xarelto blood thinner. Neurology and therapies were consulted. Outpatient PT (physical therapy) and OT (Occupational therapy) has been ordered. Due to the size of the stroke we have not been giving you Xarelto. You can resume on 4/8.       Pending Studies     Lab results that may not be resulted at time of discharge: (From admission through now)   None       Total time spent on discharge coordination: 60 minutes. Patient was seen and examined today.    Martha Raymundo MD  Internal Medicine/ [facility] Hospitalist Service    Electronically signed by Martha Raymundo MD at 04/08/2022 8:15 AM CDT      Medications at Time of Discharge  - documented as of this encounter    Medications at Time of Discharge  Medication Sig Dispensed Refills Start Date End Date   artificial tears, peg 400-propylene glycol, (SYSTANE) 0.4-0.3 % drop ophthalmic   Place 1-2 Drops into both eyes each time if needed for Dry Eyes.   0       AVODART 0.5 mg capsule   Every other day   0 07/01/2013     cholecalciferol (VITAMIN D3) 2,000 unit capsule   Take 1 capsule by mouth once daily.   0 09/27/2013     dulaglutide (Trulicity) 1.5 mg/0.5 mL subcutaneous pen   Inject 1.5 mg subcutaneous once weekly.   0       empagliflozin (Jardiance) 25 mg tablet    Indications: Diabetes mellitus type 2 in nonobese (HC) Take 1 Tablet (25 mg) by mouth once daily. DO NOT TAKE UNTIL RESUMED BY YOUR  "PRIMARY MD   0 04/07/2022     fish oil-omega-3 fatty acids (Fish OiL) 1,200-360 mg cap   Take 1 Capsule by mouth once daily. One capsule is 1200 mg-360 mg   0       lancets (ONE TOUCH ULTRASOFT LANCETS)    Indications: Diabetes mellitus type 2 in nonobese (HC)   100 Each   2 12/24/2013     lisinopriL (PRINIVIL; ZESTRIL) 10 mg tablet    Indications: HTN (hypertension) Take 1 Tablet (10 mg) by mouth once daily. DO NOT TAKE UNTIL RESUMED BY YOUR PRIMARY MD   0 04/07/2022     metFORMIN (GLUCOPHAGE XR) 500 mg Extended-Release tablet   Take 1,000 mg by mouth 2 times daily with meals.   0       metoprolol succinate (TOPROL XL) 25 mg Sustained-Release tablet   Take 25 mg by mouth once daily.   0       Needle, Disp, 18 G 18 x 1 \" ndle   As directed. Use monthly for testosterone IM injection 12 Each   0 12/24/2013     ONE TOUCH ULTRA TEST strip       0 09/19/2013     pantoprazole (Protonix) 40 mg delayed-release tablet    Indications: Duodenal stenosis Take 1 Tablet (40 mg) by mouth 2 times daily before meals. 60 Tablet   0 04/06/2022     pregabalin (LYRICA) 50 mg capsule   Take 50 mg by mouth 3 times daily.   0       rivaroxaban (Xarelto) 20 mg tablet    Indications: Paroxysmal atrial fibrillation (HC) Take 1 Tablet (20 mg) by mouth once daily with evening meal. START ON Friday 4/8   0 04/08/2022     rOPINIRole (REQUIP) 0.25 mg tablet   Take 0.25 mg by mouth at bedtime.   0       simvastatin (ZOCOR) 40 mg tablet   TAKE 1 TABLET BY MOUTH EVERY DAY WITH EVENING MEAL 30 tablet   0 06/01/2014     Syringe, Reusable, 2 mL syrr   As directed. Monthly for depotestosterone injection 12 Syringe   0 12/24/2013     tamsulosin (FLOMAX) 0.4 mg capsule   Take 0.4 mg by mouth once every other day.   0         Ordered Prescriptions  - documented in this encounter  Reconcile with Patient's Chart    Ordered Prescriptions  Prescription Sig Dispensed Refills Start Date End Date   empagliflozin (Jardiance) 25 mg tablet    Indications: Diabetes " mellitus type 2 in nonobese (HC) Take 1 Tablet (25 mg) by mouth once daily. DO NOT TAKE UNTIL RESUMED BY YOUR PRIMARY MD   0 04/07/2022     lisinopriL (PRINIVIL; ZESTRIL) 10 mg tablet    Indications: HTN (hypertension) Take 1 Tablet (10 mg) by mouth once daily. DO NOT TAKE UNTIL RESUMED BY YOUR PRIMARY MD   0 04/07/2022     rivaroxaban (Xarelto) 20 mg tablet    Indications: Paroxysmal atrial fibrillation (HC) Take 1 Tablet (20 mg) by mouth once daily with evening meal. START ON Friday 4/8   0 04/08/2022     pantoprazole (Protonix) 40 mg delayed-release tablet    Indications: Duodenal stenosis Take 1 Tablet (40 mg) by mouth 2 times daily before meals. 60 Tablet   0 04/06/2022       Discharge Disposition  - documented in this encounter    Discharge Disposition  Disposition Code Departure Means Destination   Home Self Care           Progress Notes  - documented in this encounter    Table of Contents for Progress Notes   She Marcial RN - 04/07/2022 3:03 PM CDT   Jennifer Padgett RN - 04/07/2022 1:56 PM CDT   Cassie Tate NP - 04/07/2022 6:15 AM CDT   Sergey Gar RN - 04/07/2022 3:22 AM CDT   Sregey Gar RN - 04/06/2022 10:34 PM CDT   Pearl Vegas RN - 04/06/2022 10:28 PM CDT   Carmen Blevins NP - 04/06/2022 3:59 PM CDT   Shanna Clemens RN - 04/06/2022 2:20 PM CDT   Martha Raymundo MD - 04/06/2022 1:16 PM CDT   Cassie Tate NP - 04/06/2022 11:59 AM CDT   Mo Cade, PharmD - 04/06/2022 9:01 AM CDT   Bailey Dawkins RN - 04/06/2022 6:41 AM CDT   Em Doyle RN - 04/05/2022 7:40 PM CDT   Pearl Vegas RN - 04/05/2022 7:15 PM CDT   Shanna Clemens, RN - 04/05/2022 3:32 PM CDT   Marii Lemon, [facility] - 04/05/2022 12:10 PM CDT   Meghana Mclean RD,  - 04/05/2022 8:57 AM CDT   Marii Lemon, [facility] - 04/05/2022 7:59 AM CDT   Marycruz Tineo, RN - 04/05/2022 5:00 AM CDT   Carrie Palacios, RN - 04/05/2022 1:28 AM CDT        She Marcial RN - 04/07/2022 3:03  PM CDT  Formatting of this note might be different from the original.  Discharge Note    Data:  Patient has been discharged home with a family/friend. Family/friend's name wife at 1500 via wheelchair accompanied by Registered Nurse.     Action:    Written discharge/follow-up instructions were provided to patient and Spouse.    Prescriptions will resume previous medications.     Belongings and medications from home sent with patient.    Home equipment: none     Patient is being discharged with the following new lines and drains: None.    Is patient being discharged with a wound, dressing, or incision site? No.    DISCHARGE CONTACT INFORMATION  Phone number to reach the patient within the next 72 hours after discharge?: (not recorded)  May staff leave a detailed message at this number? : (not recorded)    Response:  Patient verbalized understanding of discharge instructions, reason for discharge, and necessary follow-up appointments.        Electronically signed by She Marcial RN at 04/07/2022 3:04 PM CDT

## 2022-05-05 NOTE — NURSING NOTE
Chief Complaint   Patient presents with     New Patient     Annular pancreas        Vitals:    05/05/22 0716   BP: 128/62   BP Location: Left arm   Patient Position: Sitting   Cuff Size: Adult Regular   Pulse: 69   SpO2: 99%   Weight: 72.7 kg (160 lb 3.2 oz)   Height: 1.829 m (6')       Body mass index is 21.73 kg/m .                          Jenni Macias, EMT

## 2022-05-06 NOTE — TELEPHONE ENCOUNTER
RECORDS STATUS - ALL OTHER DIAGNOSIS      RECORDS RECEIVED FROM: [facility], [facility], [facility], [facility]   DATE RECEIVED: 5/6   NOTES STATUS DETAILS   OFFICE NOTE from referring provider Epic 5/5/22   DISCHARGE SUMMARY from hospital Epic/ADDIS - [facility], [facility] Epic - Petra  4/5/22: [facility]  3/29/22: [facility]   DISCHARGE REPORT from the ER CE - Jaime 6/11/19   OPERATIVE REPORT Epic/ADDIS - Jaime Yost  4/5/22: EGD    [facility]  6/30/21, 8/8/18, 7/15/15, 4/4/15, 4/30/13, 4/10/13: EGD  7/30/15, 6/18/13, 5/28/13: EUS  5/14/13: [facility]CP    [facility]  3/17/16: Colonoscopy  3/29/15: Gastroscopy   MEDICATION LIST  Jaime   LABS     ANYTHING RELATED TO DIAGNOSIS Epic/CE 4/27/22   IMAGING (NEED IMAGES & REPORT)     CT SCANS PACS 3/29/22: [facility]    Requested - [facility]  6/11/19   XR PACS 6/20/19: [facility]

## 2022-05-11 ENCOUNTER — TELEPHONE (OUTPATIENT)
Dept: SURGERY | Facility: CLINIC | Age: 72
End: 2022-05-11
Payer: COMMERCIAL

## 2022-05-11 DIAGNOSIS — Z86.73 HISTORY OF STROKE: Primary | ICD-10-CM

## 2022-05-11 DIAGNOSIS — Z01.812 PRE-PROCEDURE LAB EXAM: ICD-10-CM

## 2022-05-11 NOTE — TELEPHONE ENCOUNTER
Patient called [facility] in [facility] and states they can get him in for a consultation and clearance prior to his surgery which is scheduled on Jennifer 3 with Dr. Zuniga. Referral and clinic notes faxed to [phone].     COVID-19 lab order faxed to [facility], Dr. Shawn Turpin, [phone]. Lab needs to be drawn on 5/30/22.     Will notify patient that both referral and COVID order have been faxed.

## 2022-05-16 ENCOUNTER — VIRTUAL VISIT (OUTPATIENT)
Dept: GASTROENTEROLOGY | Facility: CLINIC | Age: 72
End: 2022-05-16
Attending: INTERNAL MEDICINE
Payer: COMMERCIAL

## 2022-05-16 ENCOUNTER — PRE VISIT (OUTPATIENT)
Dept: GASTROENTEROLOGY | Facility: CLINIC | Age: 72
End: 2022-05-16
Payer: COMMERCIAL

## 2022-05-16 DIAGNOSIS — R19.7 DIARRHEA, UNSPECIFIED TYPE: Primary | ICD-10-CM

## 2022-05-16 DIAGNOSIS — Q45.1 ANNULAR PANCREAS: ICD-10-CM

## 2022-05-16 DIAGNOSIS — K31.5 DUODENAL OBSTRUCTION, ACQUIRED: ICD-10-CM

## 2022-05-16 DIAGNOSIS — R13.19 ESOPHAGEAL DYSPHAGIA: ICD-10-CM

## 2022-05-16 PROCEDURE — 99214 OFFICE O/P EST MOD 30 MIN: CPT | Mod: 95 | Performed by: INTERNAL MEDICINE

## 2022-05-16 NOTE — PROGRESS NOTES
Clyde is a 72 year old who is being evaluated via a billable video visit.      How would you like to obtain your AVS? Mail a copy  If the video visit is dropped, the invitation should be resent by: Text to cell phone: [phone]  Will anyone else be joining your video visit? No Pt will have Cate on call with him at home.     Video Start Time: 13:14  Video-Visit Details    Type of service:  Video Visit    Video End Time:13:33    Originating Location (pt. Location): Home    Distant Location (provider location):  [facility]     Platform used for Video Visit: Linkdex not functioning due to rendering.  Ophelia Amin    [facility]  GASTROENTEROLOGY PROGRESS NOTE  Clyde Figueroa [id number]     SUBJECTIVE:  71 yo male being seen for diarrhea.  I have seen him in the past for endoscopic procedures related to a variety of issues.     He was initially seen for EUS 7/30/2015 for evaluation of a persistent duodenal stenosis. He had been evaluated in 2013 for a duodenal ulcer and noted to have duodenal narrowing which was dilated on multiple occasions but did not seem to improve. He was eventually referred to me for the EUS to exclude neoplasm, however the EUS clearly demonstrated annular pancreas which accounted for the stenosis. At that time it was my impression that the stenosis was asymptomatic and therefore not specific intervention was pursued.    MRCP also has confirmed the annular pancreas.    He was subsequently referred back to me for EGD 8/8/2018 for surveillance of Whelan's esophagus which had been seen on one of the prior EGDs prior to the EUS. This EGD showed a widely patent Schatzki's ring, a hiatal hernia (moderate) and slight irregularity of the z-line with < 1 cm of Whelan's. Biopsies showed incomplete intestinal metaplasia without dysplasia.     He was then referred back for EGD 6/30/2021 for dysphagia. Dilation of the Schatzki's ring was performed and  Scott's biopsies obtained (although I am unclear that this truly represents Whelan's rather than intestinal metaplasia of the cardia). These were non-dysplastic. At that time there was food in the stomach which precluded passage through the duodenum, however his symptoms were no suggestive of GOO.    In April the pt was admitted at [facility] with symptomatic GOO requiring NG suction. This was discussed with our triage hospitalists who accepted the pt for transfer, reportedly to allow endoscopic dilation (which would not be recommended for annular pancreas). I heard about the transfer through our clinic (phone call from family) and discussed with Dr. Zuniga re possible surgical intervention, and this was planned for when the pt transferred.    Unfortunately he was transferred to another facility. At that time, he was managed only with endoscopic dilation of the duodenum and discharged to home without definitive treatment of his obstruction.    Subsequently the patient called my office wanting to clarify longterm plans. He had not be scheduled for any follow-up after this admission. I called him and arranged for surgical evaluation and he is now scheduled for lap duodenujejunostomy with Dr. Zuniga.    He mentioned diarrhea on the phone. He had loose stools without blood since the admission. He had labs including C dif and cryto/giardia testing which we negative. He had taken imodium 4 tabs per day with relief. I recommended additional testing and follow-up. He had repeat C dif and calprotectin which were normal.    He now reports having stopped the imodium and doing well for several weeks only to have the loose stools recur. Again no weight loss or blood. No fevers. Prior colonoscopy 3/17/16 was unremarkable.      Medications reviewed and updated. Includes simvastatin and protonix.      OBJECTIVE:  VS: There were no vitals taken for this visit.   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 Has been seen by surgery and repair planned. We discussed this today and I agree with plans. Also planning for hiatal hernia repair.  2) Chronic diarrhea. Now recurrent off imodium. Stool evaluation negative. May be microscopic colitis (potentially related to simvastatin/PPI).  Plan for colonoscopy but will need to be after repair of the duodenal stenosis.  Recommended imodium once daily until colonoscopy. If not improved after 1 week, increase to bid. Otherwise call.  Will coordinate timing with surgery.  3) Whelan's vs intestinal metaplasia of the cardia. No further surveillance recommended in light of age and very short segment.      It was a pleasure to participate in the care of this patient; please contact us with any further questions.  A total of 30 minutes was spent on the day of the visit, >50% of which was counseling regarding the above delineated issues. The remainder was review of records and imaging as well as documentation and coordination of care.    TRINITY Dye MD  Professor of Medicine  Division of Gastroenterology, Hepatology and Nutrition  [facility]  5/16/2022

## 2022-05-16 NOTE — LETTER
5/16/2022         RE: Clyde Figueroa  [id number] [facility] [address]        Dear Colleague,    Thank you for referring your patient, Clyde Figueroa, to the [facility]. Please see a copy of my visit note below.      [facility]  GASTROENTEROLOGY PROGRESS NOTE  Clyde Figueroa [id number]     SUBJECTIVE:  71 yo male being seen for diarrhea.  I have seen him in the past for endoscopic procedures related to a variety of issues.     He was initially seen for EUS 7/30/2015 for evaluation of a persistent duodenal stenosis. He had been evaluated in 2013 for a duodenal ulcer and noted to have duodenal narrowing which was dilated on multiple occasions but did not seem to improve. He was eventually referred to me for the EUS to exclude neoplasm, however the EUS clearly demonstrated annular pancreas which accounted for the stenosis. At that time it was my impression that the stenosis was asymptomatic and therefore not specific intervention was pursued.    MRCP also has confirmed the annular pancreas.    He was subsequently referred back to me for EGD 8/8/2018 for surveillance of Whelan's esophagus which had been seen on one of the prior EGDs prior to the EUS. This EGD showed a widely patent Schatzki's ring, a hiatal hernia (moderate) and slight irregularity of the z-line with < 1 cm of Whelan's. Biopsies showed incomplete intestinal metaplasia without dysplasia.     He was then referred back for EGD 6/30/2021 for dysphagia. Dilation of the Schatzki's ring was performed and Scott's biopsies obtained (although I am unclear that this truly represents Whelan's rather than intestinal metaplasia of the cardia). These were non-dysplastic. At that time there was food in the stomach which precluded passage through the duodenum, however his symptoms were no suggestive of GOO.    In April the pt was admitted at [facility] with symptomatic GOO requiring NG suction. This was  discussed with our triage hospitalists who accepted the pt for transfer, reportedly to allow endoscopic dilation (which would not be recommended for annular pancreas). I heard about the transfer through our clinic (phone call from family) and discussed with Dr. Zuniga re possible surgical intervention, and this was planned for when the pt transferred.    Unfortunately he was transferred to another facility. At that time, he was managed only with endoscopic dilation of the duodenum and discharged to home without definitive treatment of his obstruction.    Subsequently the patient called my office wanting to clarify longterm plans. He had not be scheduled for any follow-up after this admission. I called him and arranged for surgical evaluation and he is now scheduled for lap duodenujejunostomy with Dr. Zuniga.    He mentioned diarrhea on the phone. He had loose stools without blood since the admission. He had labs including C dif and cryto/giardia testing which we negative. He had taken imodium 4 tabs per day with relief. I recommended additional testing and follow-up. He had repeat C dif and calprotectin which were normal.    He now reports having stopped the imodium and doing well for several weeks only to have the loose stools recur. Again no weight loss or blood. No fevers. Prior colonoscopy 3/17/16 was unremarkable.      Medications reviewed and updated. Includes simvastatin and protonix.      OBJECTIVE:  VS: There were no vitals taken for this visit.   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 Has been seen by surgery and repair planned. We discussed this today and I agree with plans. Also planning for hiatal hernia repair.  2) Chronic diarrhea. Now recurrent off imodium. Stool evaluation negative. May be microscopic colitis (potentially related to simvastatin/PPI).  Plan for colonoscopy  but will need to be after repair of the duodenal stenosis.  Recommended imodium once daily until colonoscopy. If not improved after 1 week, increase to bid. Otherwise call.  Will coordinate timing with surgery.  3) Whelan's vs intestinal metaplasia of the cardia. No further surveillance recommended in light of age and very short segment.      It was a pleasure to participate in the care of this patient; please contact us with any further questions.  A total of 30 minutes was spent on the day of the visit, >50% of which was counseling regarding the above delineated issues. The remainder was review of records and imaging as well as documentation and coordination of care.      TRINITY Dye MD  Professor of Medicine  Division of Gastroenterology, Hepatology and Nutrition  [facility]  5/16/2022

## 2022-05-17 ENCOUNTER — PATIENT OUTREACH (OUTPATIENT)
Dept: GASTROENTEROLOGY | Facility: CLINIC | Age: 72
End: 2022-05-17
Payer: COMMERCIAL

## 2022-05-17 DIAGNOSIS — R19.7 DIARRHEA, UNSPECIFIED TYPE: Primary | ICD-10-CM

## 2022-05-17 NOTE — CONFIDENTIAL NOTE
Called pt to discuss follow up from Dr Dye's clinic on 5/16.     This pt will need colonoscopy with me for evaluation of diarrhea, however I think this should wait until his duodenal obstruction is resolved. He is having surgery 6/3 by Dr. Zuniga.   Please schedule in mid July.     Pt would like to wait until August to schedule. Asked that we call after his surgery with Dr Zuniga to discuss tentative timing for colonoscopy    Gastro procedure order placed, message routed to endo schedulers with specifics of waiting till mid July to schedule.     Jennifer Hendricks, RN, BSN,   [facility]  Care coordinator

## 2022-05-19 ENCOUNTER — TELEPHONE (OUTPATIENT)
Dept: GASTROENTEROLOGY | Facility: CLINIC | Age: 72
End: 2022-05-19
Payer: COMMERCIAL

## 2022-05-19 NOTE — TELEPHONE ENCOUNTER
UPDATE 6/9: Sent letter which includes the updated COVID testing protocol (home test instead of clinic).  RAI    Pt requests a referral for a covid test at [facility], [place], [place] ahead of his colonoscopy, 8/10, [facility].    Sent request for the referral to in basket - endo nurses.  RAI

## 2022-05-19 NOTE — TELEPHONE ENCOUNTER
Screening Questions  BlueKIND OF PREP RedLOCATION [review exclusion criteria] GreenSEDATION TYPE  1. Have you had a positive covid test in the last 90 days? N     2. Do you have a legal guardian or medical Power of ?  Are you able to give consent for your medical care?Y (Sedation review/consideration needed)    3. Are you active on mychart? N    4. What insurance is in the chart? BCBS MEDICARE     3.   Ordering/Referring Provider: Darren Dye MD     4. BMI 24.4 [BMI OVER 40-EXTENDED PREP]  If greater than 40 review exclusion criteria [PAC APPT IF @ [facility]]      5.  Respiratory Screening :  [If yes to any of the following HOSPITAL setting only]     Do you use daily home oxygen? N    Do you have mod to severe Obstructive Sleep Apnea?  Y[OKAY @ [facility] SH PH RI]   Do you have Pulmonary Hypertension? N     Do you have UNCONTROLLED asthma? N        6.   Have you had a heart or lung transplant? N      7.   Are you currently on dialysis? N [ If yes, G-PREP & HOSPITAL setting only]     8.   Do you have chronic kidney disease? N [ If yes, G-PREP ]    9.   Have you had a stroke or Transient ischemic attack (TIA - aka  mini stroke ) within 6 months?  Y (If yes, please review exclusion criteria)    10.   In the past 6 months, have you had any heart related issues including cardiomyopathy or heart attack? N           If yes, did it require cardiac stenting or other implantable device? N      11.   Do you have any implantable devices in your body (pacemaker, defib, LVAD)? N (If yes, please review exclusion criteria)    12.   Do you take nitroglycerin? N           If yes, how often? N  (if yes, HOSPITAL setting ONLY)    13.   Are you currently taking any blood thinners? Y          [IF YES, INFORM PATIENT TO FOLLOW UP W/ ORDERING PROVIDER FOR BRIDGING INSTRUCTIONS]     14.   Do you have a diagnosis of diabetes? Y   [ If yes, G-PREP ]    15.   [FEMALES] Are you currently pregnant?     If yes, how many weeks?      16.   Are you taking any prescription pain medications on a routine schedule?  N  [ If yes, EXTENDED PREP.] [If yes, MAC]    17.   Do you have any chemical dependencies such as alcohol, street drugs, or methadone?  N [If yes, MAC]    18.   Do you have any history of post-traumatic stress syndrome, severe anxiety or history of psychosis?  N  [If yes, MAC]    19.   Do you transfer independently?  Y    20.  On a regular basis do you go 3-5 days between bowel movements? N   [ If yes, EXTENDED PREP.]    21.   Preferred LOCAL Pharmacy for Pre Prescription   [id number] [place] [address]      Scheduling Details      Caller :  Clyde   (Please ask for phone number if not scheduled by patient)    Type of Procedure Scheduled: Lower Endoscopy [Colonoscopy]  Which Colonoscopy Prep was Sent?: CORIE CHEN CF PATIENTS & GROEN'S PATIENTS NEEDS EXTENDED PREP  Surgeon: GREGG  Date of Procedure: 8/10  Location: [facility]    Sedation Type: CS    Conscious Sedation- Needs  for 6 hours after the procedure  MAC/General-Needs  for 24 hours after procedure    Pre-op Required at [facility] [place], [place] and OR for MAC sedation: N  (advise patient they will need a pre-op prior to procedure -)      Informed patient they will need an adult  Y  Cannot take any type of public or medical transportation alone    Pre-Procedure Covid test to [facility] completed [facility] or Externally: PT WILL DO CLOSE TO HOME    Confirmed Nurse will call to complete assessment Y    Additional comments:

## 2022-06-01 ENCOUNTER — TELEPHONE (OUTPATIENT)
Dept: SURGERY | Facility: CLINIC | Age: 72
End: 2022-06-01
Payer: COMMERCIAL

## 2022-06-01 ENCOUNTER — TELEPHONE (OUTPATIENT)
Dept: ENDOCRINOLOGY | Facility: CLINIC | Age: 72
End: 2022-06-01
Payer: COMMERCIAL

## 2022-06-01 NOTE — TELEPHONE ENCOUNTER
Reason for call:  Other   Patient called regarding (reason for call): call back  Additional comments: Patient has questions about upcoming procedure with MD Zuniga on Friday. He was connected to Jingit, but would like a call back to discuss the details.     Phone number to reach patient:  Home number on file [phone] (home)    Best Time:  Anytime     Can we leave a detailed message on this number?  YES    Travel screening: Not Applicable

## 2022-06-01 NOTE — TELEPHONE ENCOUNTER
Patient called wanting to know when he should arrive for surgery and when should he stop eating/drinking. He said he has not received a call from anyone. I did tell him someone from preadmission nursing would be reaching out to him to discuss, maybe delayed due to the holiday this Monday.

## 2022-06-02 ENCOUNTER — TELEPHONE (OUTPATIENT)
Dept: SURGERY | Facility: CLINIC | Age: 72
End: 2022-06-02
Payer: COMMERCIAL

## 2022-06-02 NOTE — TELEPHONE ENCOUNTER
"Received the COVID-19/SARS-CoV2, PCR final result, tested on 5/30, resulted 6/1. Drawn at [facility] [phone], result lab is [place] [phone]. \"Undetected\"  "

## 2022-06-03 ENCOUNTER — ANESTHESIA (OUTPATIENT)
Dept: SURGERY | Facility: CLINIC | Age: 72
End: 2022-06-03
Payer: COMMERCIAL

## 2022-06-03 ENCOUNTER — APPOINTMENT (OUTPATIENT)
Dept: GENERAL RADIOLOGY | Facility: CLINIC | Age: 72
DRG: 327 | End: 2022-06-03
Attending: SURGERY
Payer: COMMERCIAL

## 2022-06-03 ENCOUNTER — HOSPITAL ENCOUNTER (INPATIENT)
Facility: CLINIC | Age: 72
LOS: 3 days | Discharge: HOME | DRG: 327 | End: 2022-06-06
Attending: SURGERY | Admitting: SURGERY
Payer: COMMERCIAL

## 2022-06-03 ENCOUNTER — ANESTHESIA EVENT (OUTPATIENT)
Dept: SURGERY | Facility: CLINIC | Age: 72
End: 2022-06-03
Payer: COMMERCIAL

## 2022-06-03 DIAGNOSIS — Z98.890 S/P LAPAROSCOPIC SURGERY: ICD-10-CM

## 2022-06-03 DIAGNOSIS — I48.0 PAROXYSMAL ATRIAL FIBRILLATION (H): ICD-10-CM

## 2022-06-03 DIAGNOSIS — Q45.1 ANNULAR PANCREAS: Primary | ICD-10-CM

## 2022-06-03 DIAGNOSIS — I48.91 ATRIAL FIBRILLATION (H): ICD-10-CM

## 2022-06-03 LAB
CREAT SERPL-MCNC: 0.79 MG/DL (ref 0.66–1.25)
GFR SERPL CREATININE-BSD FRML MDRD: >90 ML/MIN/1.73M2
GLUCOSE BLDC GLUCOMTR-MCNC: 121 MG/DL (ref 70–99)
GLUCOSE BLDC GLUCOMTR-MCNC: 136 MG/DL (ref 70–99)
GLUCOSE BLDC GLUCOMTR-MCNC: 152 MG/DL (ref 70–99)
GLUCOSE BLDC GLUCOMTR-MCNC: 157 MG/DL (ref 70–99)
GLUCOSE BLDC GLUCOMTR-MCNC: 172 MG/DL (ref 70–99)

## 2022-06-03 PROCEDURE — 71045 X-RAY EXAM CHEST 1 VIEW: CPT | Mod: 26 | Performed by: RADIOLOGY

## 2022-06-03 PROCEDURE — 250N000011 HC RX IP 250 OP 636: Performed by: NURSE ANESTHETIST, CERTIFIED REGISTERED

## 2022-06-03 PROCEDURE — 258N000003 HC RX IP 258 OP 636: Performed by: NURSE ANESTHETIST, CERTIFIED REGISTERED

## 2022-06-03 PROCEDURE — 999N000141 HC STATISTIC PRE-PROCEDURE NURSING ASSESSMENT: Performed by: SURGERY

## 2022-06-03 PROCEDURE — 250N000011 HC RX IP 250 OP 636: Performed by: SURGERY

## 2022-06-03 PROCEDURE — 82565 ASSAY OF CREATININE: CPT | Performed by: SURGERY

## 2022-06-03 PROCEDURE — 250N000009 HC RX 250: Performed by: NURSE ANESTHETIST, CERTIFIED REGISTERED

## 2022-06-03 PROCEDURE — 250N000025 HC SEVOFLURANE, PER MIN: Performed by: SURGERY

## 2022-06-03 PROCEDURE — 370N000017 HC ANESTHESIA TECHNICAL FEE, PER MIN: Performed by: SURGERY

## 2022-06-03 PROCEDURE — 250N000011 HC RX IP 250 OP 636: Performed by: STUDENT IN AN ORGANIZED HEALTH CARE EDUCATION/TRAINING PROGRAM

## 2022-06-03 PROCEDURE — 0DS64ZZ REPOSITION STOMACH, PERCUTANEOUS ENDOSCOPIC APPROACH: ICD-10-PCS | Performed by: SURGERY

## 2022-06-03 PROCEDURE — 0D194ZA BYPASS DUODENUM TO JEJUNUM, PERCUTANEOUS ENDOSCOPIC APPROACH: ICD-10-PCS | Performed by: SURGERY

## 2022-06-03 PROCEDURE — 258N000003 HC RX IP 258 OP 636: Performed by: SURGERY

## 2022-06-03 PROCEDURE — 258N000003 HC RX IP 258 OP 636: Performed by: ANESTHESIOLOGY

## 2022-06-03 PROCEDURE — 250N000011 HC RX IP 250 OP 636

## 2022-06-03 PROCEDURE — 43281 LAP PARAESOPHAG HERN REPAIR: CPT | Mod: GC | Performed by: SURGERY

## 2022-06-03 PROCEDURE — 360N000077 HC SURGERY LEVEL 4, PER MIN: Performed by: SURGERY

## 2022-06-03 PROCEDURE — 272N000001 HC OR GENERAL SUPPLY STERILE: Performed by: SURGERY

## 2022-06-03 PROCEDURE — 44130 BOWEL TO BOWEL FUSION: CPT | Mod: GC | Performed by: SURGERY

## 2022-06-03 PROCEDURE — 250N000013 HC RX MED GY IP 250 OP 250 PS 637: Performed by: ANESTHESIOLOGY

## 2022-06-03 PROCEDURE — 999N000065 XR CHEST PORT 1 VIEW

## 2022-06-03 PROCEDURE — 250N000011 HC RX IP 250 OP 636: Performed by: ANESTHESIOLOGY

## 2022-06-03 PROCEDURE — 710N000010 HC RECOVERY PHASE 1, LEVEL 2, PER MIN: Performed by: SURGERY

## 2022-06-03 PROCEDURE — 250N000009 HC RX 250: Performed by: ANESTHESIOLOGY

## 2022-06-03 PROCEDURE — 250N000013 HC RX MED GY IP 250 OP 250 PS 637: Performed by: SURGERY

## 2022-06-03 PROCEDURE — 0BQT4ZZ REPAIR DIAPHRAGM, PERCUTANEOUS ENDOSCOPIC APPROACH: ICD-10-PCS | Performed by: SURGERY

## 2022-06-03 PROCEDURE — 120N000002 HC R&B MED SURG/OB UMMC

## 2022-06-03 RX ORDER — METOPROLOL TARTRATE 25 MG/1
25 TABLET, FILM COATED ORAL 2 TIMES DAILY
Status: DISCONTINUED | OUTPATIENT
Start: 2022-06-03 | End: 2022-06-05

## 2022-06-03 RX ORDER — AMOXICILLIN 250 MG
2 CAPSULE ORAL 2 TIMES DAILY
Status: DISCONTINUED | OUTPATIENT
Start: 2022-06-03 | End: 2022-06-06 | Stop reason: HOSPADM

## 2022-06-03 RX ORDER — DIPHENHYDRAMINE HYDROCHLORIDE 50 MG/ML
12.5 INJECTION INTRAMUSCULAR; INTRAVENOUS EVERY 6 HOURS PRN
Status: DISCONTINUED | OUTPATIENT
Start: 2022-06-03 | End: 2022-06-06 | Stop reason: HOSPADM

## 2022-06-03 RX ORDER — NICOTINE POLACRILEX 4 MG
15-30 LOZENGE BUCCAL
Status: DISCONTINUED | OUTPATIENT
Start: 2022-06-03 | End: 2022-06-06 | Stop reason: HOSPADM

## 2022-06-03 RX ORDER — ONDANSETRON 2 MG/ML
INJECTION INTRAMUSCULAR; INTRAVENOUS PRN
Status: DISCONTINUED | OUTPATIENT
Start: 2022-06-03 | End: 2022-06-03

## 2022-06-03 RX ORDER — PROCHLORPERAZINE MALEATE 5 MG
5 TABLET ORAL EVERY 6 HOURS PRN
Status: DISCONTINUED | OUTPATIENT
Start: 2022-06-03 | End: 2022-06-06 | Stop reason: HOSPADM

## 2022-06-03 RX ORDER — SODIUM CHLORIDE, SODIUM LACTATE, POTASSIUM CHLORIDE, CALCIUM CHLORIDE 600; 310; 30; 20 MG/100ML; MG/100ML; MG/100ML; MG/100ML
INJECTION, SOLUTION INTRAVENOUS CONTINUOUS PRN
Status: DISCONTINUED | OUTPATIENT
Start: 2022-06-03 | End: 2022-06-03

## 2022-06-03 RX ORDER — PANTOPRAZOLE SODIUM 40 MG/1
40 TABLET, DELAYED RELEASE ORAL DAILY
Status: DISCONTINUED | OUTPATIENT
Start: 2022-06-03 | End: 2022-06-06 | Stop reason: HOSPADM

## 2022-06-03 RX ORDER — ACETAMINOPHEN 325 MG/1
650 TABLET ORAL EVERY 4 HOURS PRN
Status: DISCONTINUED | OUTPATIENT
Start: 2022-06-03 | End: 2022-06-06 | Stop reason: HOSPADM

## 2022-06-03 RX ORDER — TAMSULOSIN HYDROCHLORIDE 0.4 MG/1
0.4 CAPSULE ORAL DAILY
Status: DISCONTINUED | OUTPATIENT
Start: 2022-06-03 | End: 2022-06-06 | Stop reason: HOSPADM

## 2022-06-03 RX ORDER — NALOXONE HYDROCHLORIDE 0.4 MG/ML
0.4 INJECTION, SOLUTION INTRAMUSCULAR; INTRAVENOUS; SUBCUTANEOUS
Status: DISCONTINUED | OUTPATIENT
Start: 2022-06-03 | End: 2022-06-06 | Stop reason: HOSPADM

## 2022-06-03 RX ORDER — SIMVASTATIN 20 MG
40 TABLET ORAL EVERY EVENING
Status: DISCONTINUED | OUTPATIENT
Start: 2022-06-03 | End: 2022-06-06 | Stop reason: HOSPADM

## 2022-06-03 RX ORDER — HEPARIN SODIUM 5000 [USP'U]/.5ML
5000 INJECTION, SOLUTION INTRAVENOUS; SUBCUTANEOUS
Status: COMPLETED | OUTPATIENT
Start: 2022-06-03 | End: 2022-06-03

## 2022-06-03 RX ORDER — METOPROLOL TARTRATE 25 MG/1
25 TABLET, FILM COATED ORAL ONCE
Status: COMPLETED | OUTPATIENT
Start: 2022-06-03 | End: 2022-06-03

## 2022-06-03 RX ORDER — ENOXAPARIN SODIUM 100 MG/ML
40 INJECTION SUBCUTANEOUS EVERY 24 HOURS
Status: DISCONTINUED | OUTPATIENT
Start: 2022-06-04 | End: 2022-06-06 | Stop reason: CLARIF

## 2022-06-03 RX ORDER — ROPINIROLE 0.25 MG/1
0.25 TABLET, FILM COATED ORAL AT BEDTIME
Status: DISCONTINUED | OUTPATIENT
Start: 2022-06-03 | End: 2022-06-06 | Stop reason: HOSPADM

## 2022-06-03 RX ORDER — PROPOFOL 10 MG/ML
INJECTION, EMULSION INTRAVENOUS PRN
Status: DISCONTINUED | OUTPATIENT
Start: 2022-06-03 | End: 2022-06-03

## 2022-06-03 RX ORDER — ONDANSETRON 2 MG/ML
4 INJECTION INTRAMUSCULAR; INTRAVENOUS EVERY 6 HOURS PRN
Status: DISCONTINUED | OUTPATIENT
Start: 2022-06-03 | End: 2022-06-06 | Stop reason: HOSPADM

## 2022-06-03 RX ORDER — DIPHENHYDRAMINE HCL 12.5MG/5ML
12.5 LIQUID (ML) ORAL EVERY 6 HOURS PRN
Status: DISCONTINUED | OUTPATIENT
Start: 2022-06-03 | End: 2022-06-06 | Stop reason: HOSPADM

## 2022-06-03 RX ORDER — ENALAPRILAT 1.25 MG/ML
1.25 INJECTION INTRAVENOUS EVERY 6 HOURS PRN
Status: DISCONTINUED | OUTPATIENT
Start: 2022-06-03 | End: 2022-06-06 | Stop reason: HOSPADM

## 2022-06-03 RX ORDER — DEXAMETHASONE SODIUM PHOSPHATE 4 MG/ML
INJECTION, SOLUTION INTRA-ARTICULAR; INTRALESIONAL; INTRAMUSCULAR; INTRAVENOUS; SOFT TISSUE PRN
Status: DISCONTINUED | OUTPATIENT
Start: 2022-06-03 | End: 2022-06-03

## 2022-06-03 RX ORDER — CEFAZOLIN SODIUM/WATER 2 G/20 ML
2 SYRINGE (ML) INTRAVENOUS
Status: COMPLETED | OUTPATIENT
Start: 2022-06-03 | End: 2022-06-03

## 2022-06-03 RX ORDER — DEXTROSE MONOHYDRATE 25 G/50ML
25-50 INJECTION, SOLUTION INTRAVENOUS
Status: DISCONTINUED | OUTPATIENT
Start: 2022-06-03 | End: 2022-06-06 | Stop reason: HOSPADM

## 2022-06-03 RX ORDER — ONDANSETRON 2 MG/ML
4 INJECTION INTRAMUSCULAR; INTRAVENOUS EVERY 30 MIN PRN
Status: DISCONTINUED | OUTPATIENT
Start: 2022-06-03 | End: 2022-06-03 | Stop reason: HOSPADM

## 2022-06-03 RX ORDER — ONDANSETRON 4 MG/1
4 TABLET, ORALLY DISINTEGRATING ORAL EVERY 6 HOURS PRN
Status: DISCONTINUED | OUTPATIENT
Start: 2022-06-03 | End: 2022-06-06 | Stop reason: HOSPADM

## 2022-06-03 RX ORDER — CARBIDOPA AND LEVODOPA 25; 100 MG/1; MG/1
1 TABLET, EXTENDED RELEASE ORAL AT BEDTIME
Status: DISCONTINUED | OUTPATIENT
Start: 2022-06-03 | End: 2022-06-04

## 2022-06-03 RX ORDER — HYDRALAZINE HYDROCHLORIDE 20 MG/ML
10 INJECTION INTRAMUSCULAR; INTRAVENOUS ONCE
Status: COMPLETED | OUTPATIENT
Start: 2022-06-03 | End: 2022-06-03

## 2022-06-03 RX ORDER — NALOXONE HYDROCHLORIDE 0.4 MG/ML
0.2 INJECTION, SOLUTION INTRAMUSCULAR; INTRAVENOUS; SUBCUTANEOUS
Status: DISCONTINUED | OUTPATIENT
Start: 2022-06-03 | End: 2022-06-06 | Stop reason: HOSPADM

## 2022-06-03 RX ORDER — HYDRALAZINE HYDROCHLORIDE 20 MG/ML
5-10 INJECTION INTRAMUSCULAR; INTRAVENOUS EVERY 4 HOURS PRN
Status: DISCONTINUED | OUTPATIENT
Start: 2022-06-03 | End: 2022-06-06 | Stop reason: HOSPADM

## 2022-06-03 RX ORDER — HYDROMORPHONE HCL IN WATER/PF 6 MG/30 ML
0.2 PATIENT CONTROLLED ANALGESIA SYRINGE INTRAVENOUS
Status: DISCONTINUED | OUTPATIENT
Start: 2022-06-03 | End: 2022-06-06 | Stop reason: HOSPADM

## 2022-06-03 RX ORDER — DUTASTERIDE 0.5 MG/1
0.5 CAPSULE, LIQUID FILLED ORAL
Status: DISCONTINUED | OUTPATIENT
Start: 2022-06-03 | End: 2022-06-06 | Stop reason: HOSPADM

## 2022-06-03 RX ORDER — ONDANSETRON 4 MG/1
4 TABLET, ORALLY DISINTEGRATING ORAL EVERY 30 MIN PRN
Status: DISCONTINUED | OUTPATIENT
Start: 2022-06-03 | End: 2022-06-03 | Stop reason: HOSPADM

## 2022-06-03 RX ORDER — FENTANYL CITRATE 50 UG/ML
INJECTION, SOLUTION INTRAMUSCULAR; INTRAVENOUS PRN
Status: DISCONTINUED | OUTPATIENT
Start: 2022-06-03 | End: 2022-06-03

## 2022-06-03 RX ORDER — FENTANYL CITRATE 50 UG/ML
25 INJECTION, SOLUTION INTRAMUSCULAR; INTRAVENOUS EVERY 5 MIN PRN
Status: DISCONTINUED | OUTPATIENT
Start: 2022-06-03 | End: 2022-06-03 | Stop reason: HOSPADM

## 2022-06-03 RX ORDER — SODIUM CHLORIDE, SODIUM LACTATE, POTASSIUM CHLORIDE, CALCIUM CHLORIDE 600; 310; 30; 20 MG/100ML; MG/100ML; MG/100ML; MG/100ML
INJECTION, SOLUTION INTRAVENOUS CONTINUOUS
Status: DISCONTINUED | OUTPATIENT
Start: 2022-06-03 | End: 2022-06-04

## 2022-06-03 RX ORDER — LIDOCAINE HYDROCHLORIDE 20 MG/ML
INJECTION, SOLUTION INFILTRATION; PERINEURAL PRN
Status: DISCONTINUED | OUTPATIENT
Start: 2022-06-03 | End: 2022-06-03

## 2022-06-03 RX ORDER — OXYCODONE HYDROCHLORIDE 5 MG/1
5 TABLET ORAL EVERY 4 HOURS PRN
Status: DISCONTINUED | OUTPATIENT
Start: 2022-06-03 | End: 2022-06-03 | Stop reason: HOSPADM

## 2022-06-03 RX ORDER — SODIUM CHLORIDE, SODIUM LACTATE, POTASSIUM CHLORIDE, CALCIUM CHLORIDE 600; 310; 30; 20 MG/100ML; MG/100ML; MG/100ML; MG/100ML
INJECTION, SOLUTION INTRAVENOUS CONTINUOUS
Status: DISCONTINUED | OUTPATIENT
Start: 2022-06-03 | End: 2022-06-03 | Stop reason: HOSPADM

## 2022-06-03 RX ORDER — LIDOCAINE 40 MG/G
CREAM TOPICAL
Status: DISCONTINUED | OUTPATIENT
Start: 2022-06-03 | End: 2022-06-06 | Stop reason: HOSPADM

## 2022-06-03 RX ORDER — LABETALOL 20 MG/4 ML (5 MG/ML) INTRAVENOUS SYRINGE
PRN
Status: DISCONTINUED | OUTPATIENT
Start: 2022-06-03 | End: 2022-06-03

## 2022-06-03 RX ORDER — PREGABALIN 50 MG/1
50 CAPSULE ORAL 3 TIMES DAILY
COMMUNITY

## 2022-06-03 RX ORDER — OXYCODONE HYDROCHLORIDE 5 MG/1
5 TABLET ORAL
Status: DISCONTINUED | OUTPATIENT
Start: 2022-06-03 | End: 2022-06-06 | Stop reason: HOSPADM

## 2022-06-03 RX ORDER — OXYCODONE HYDROCHLORIDE 10 MG/1
10 TABLET ORAL
Status: DISCONTINUED | OUTPATIENT
Start: 2022-06-03 | End: 2022-06-06 | Stop reason: HOSPADM

## 2022-06-03 RX ORDER — LIDOCAINE 40 MG/G
CREAM TOPICAL
Status: CANCELLED | OUTPATIENT
Start: 2022-06-03

## 2022-06-03 RX ORDER — HYDROMORPHONE HCL IN WATER/PF 6 MG/30 ML
0.2 PATIENT CONTROLLED ANALGESIA SYRINGE INTRAVENOUS EVERY 5 MIN PRN
Status: DISCONTINUED | OUTPATIENT
Start: 2022-06-03 | End: 2022-06-03 | Stop reason: HOSPADM

## 2022-06-03 RX ORDER — CEFAZOLIN SODIUM/WATER 2 G/20 ML
2 SYRINGE (ML) INTRAVENOUS SEE ADMIN INSTRUCTIONS
Status: DISCONTINUED | OUTPATIENT
Start: 2022-06-03 | End: 2022-06-03 | Stop reason: HOSPADM

## 2022-06-03 RX ADMIN — FENTANYL CITRATE 25 MCG: 50 INJECTION, SOLUTION INTRAMUSCULAR; INTRAVENOUS at 14:27

## 2022-06-03 RX ADMIN — ROPINIROLE HYDROCHLORIDE 0.25 MG: 0.25 TABLET, FILM COATED ORAL at 22:26

## 2022-06-03 RX ADMIN — Medication 10 MG: at 11:45

## 2022-06-03 RX ADMIN — FENTANYL CITRATE 50 MCG: 50 INJECTION, SOLUTION INTRAMUSCULAR; INTRAVENOUS at 12:30

## 2022-06-03 RX ADMIN — HYDROMORPHONE HYDROCHLORIDE 0.2 MG: 0.2 INJECTION, SOLUTION INTRAMUSCULAR; INTRAVENOUS; SUBCUTANEOUS at 16:23

## 2022-06-03 RX ADMIN — DOCUSATE SODIUM 50 MG AND SENNOSIDES 8.6 MG 2 TABLET: 8.6; 5 TABLET, FILM COATED ORAL at 21:08

## 2022-06-03 RX ADMIN — HYDRALAZINE HYDROCHLORIDE 10 MG: 20 INJECTION INTRAMUSCULAR; INTRAVENOUS at 18:45

## 2022-06-03 RX ADMIN — SODIUM CHLORIDE, POTASSIUM CHLORIDE, SODIUM LACTATE AND CALCIUM CHLORIDE: 600; 310; 30; 20 INJECTION, SOLUTION INTRAVENOUS at 09:29

## 2022-06-03 RX ADMIN — LABETALOL 20 MG/4 ML (5 MG/ML) INTRAVENOUS SYRINGE 10 MG: at 12:01

## 2022-06-03 RX ADMIN — PANTOPRAZOLE SODIUM 40 MG: 40 TABLET, DELAYED RELEASE ORAL at 16:23

## 2022-06-03 RX ADMIN — SODIUM CHLORIDE, POTASSIUM CHLORIDE, SODIUM LACTATE AND CALCIUM CHLORIDE: 600; 310; 30; 20 INJECTION, SOLUTION INTRAVENOUS at 11:08

## 2022-06-03 RX ADMIN — LABETALOL 20 MG/4 ML (5 MG/ML) INTRAVENOUS SYRINGE 10 MG: at 12:20

## 2022-06-03 RX ADMIN — TAMSULOSIN HYDROCHLORIDE 0.4 MG: 0.4 CAPSULE ORAL at 16:23

## 2022-06-03 RX ADMIN — LABETALOL 20 MG/4 ML (5 MG/ML) INTRAVENOUS SYRINGE 5 MG: at 11:36

## 2022-06-03 RX ADMIN — PROPOFOL 30 MG: 10 INJECTION, EMULSION INTRAVENOUS at 10:13

## 2022-06-03 RX ADMIN — FENTANYL CITRATE 25 MCG: 50 INJECTION, SOLUTION INTRAMUSCULAR; INTRAVENOUS at 14:21

## 2022-06-03 RX ADMIN — Medication 2 G: at 10:00

## 2022-06-03 RX ADMIN — FENTANYL CITRATE 50 MCG: 50 INJECTION, SOLUTION INTRAMUSCULAR; INTRAVENOUS at 11:59

## 2022-06-03 RX ADMIN — METOPROLOL TARTRATE 25 MG: 25 TABLET, FILM COATED ORAL at 08:58

## 2022-06-03 RX ADMIN — HEPARIN SODIUM 5000 UNITS: 5000 INJECTION, SOLUTION INTRAVENOUS; SUBCUTANEOUS at 08:59

## 2022-06-03 RX ADMIN — PROPOFOL 130 MG: 10 INJECTION, EMULSION INTRAVENOUS at 09:38

## 2022-06-03 RX ADMIN — PHENYLEPHRINE HYDROCHLORIDE 100 MCG: 10 INJECTION INTRAVENOUS at 10:46

## 2022-06-03 RX ADMIN — ONDANSETRON 4 MG: 2 INJECTION INTRAMUSCULAR; INTRAVENOUS at 12:02

## 2022-06-03 RX ADMIN — FENTANYL CITRATE 100 MCG: 50 INJECTION, SOLUTION INTRAMUSCULAR; INTRAVENOUS at 10:11

## 2022-06-03 RX ADMIN — DUTASTERIDE 0.5 MG: 0.5 CAPSULE, LIQUID FILLED ORAL at 17:23

## 2022-06-03 RX ADMIN — LABETALOL 20 MG/4 ML (5 MG/ML) INTRAVENOUS SYRINGE 5 MG: at 11:23

## 2022-06-03 RX ADMIN — FENTANYL CITRATE 25 MCG: 50 INJECTION, SOLUTION INTRAMUSCULAR; INTRAVENOUS at 15:09

## 2022-06-03 RX ADMIN — LIDOCAINE HYDROCHLORIDE 100 MG: 20 INJECTION, SOLUTION INFILTRATION; PERINEURAL at 09:38

## 2022-06-03 RX ADMIN — HYDRALAZINE HYDROCHLORIDE 10 MG: 20 INJECTION, SOLUTION INTRAMUSCULAR; INTRAVENOUS at 12:30

## 2022-06-03 RX ADMIN — OXYCODONE HYDROCHLORIDE 10 MG: 10 TABLET ORAL at 21:08

## 2022-06-03 RX ADMIN — SIMVASTATIN 40 MG: 20 TABLET, FILM COATED ORAL at 21:08

## 2022-06-03 RX ADMIN — SUGAMMADEX 200 MG: 100 INJECTION, SOLUTION INTRAVENOUS at 12:18

## 2022-06-03 RX ADMIN — METOPROLOL TARTRATE 25 MG: 25 TABLET, FILM COATED ORAL at 21:08

## 2022-06-03 RX ADMIN — Medication 20 MG: at 10:44

## 2022-06-03 RX ADMIN — FENTANYL CITRATE 25 MCG: 50 INJECTION, SOLUTION INTRAMUSCULAR; INTRAVENOUS at 14:33

## 2022-06-03 RX ADMIN — CARBIDOPA AND LEVODOPA 1 TABLET: 25; 100 TABLET, EXTENDED RELEASE ORAL at 22:26

## 2022-06-03 RX ADMIN — LABETALOL 20 MG/4 ML (5 MG/ML) INTRAVENOUS SYRINGE 10 MG: at 12:35

## 2022-06-03 RX ADMIN — LABETALOL 20 MG/4 ML (5 MG/ML) INTRAVENOUS SYRINGE 5 MG: at 11:11

## 2022-06-03 RX ADMIN — SODIUM CHLORIDE, POTASSIUM CHLORIDE, SODIUM LACTATE AND CALCIUM CHLORIDE: 600; 310; 30; 20 INJECTION, SOLUTION INTRAVENOUS at 18:03

## 2022-06-03 RX ADMIN — LABETALOL 20 MG/4 ML (5 MG/ML) INTRAVENOUS SYRINGE 5 MG: at 11:49

## 2022-06-03 RX ADMIN — Medication 50 MG: at 09:38

## 2022-06-03 RX ADMIN — FENTANYL CITRATE 100 MCG: 50 INJECTION, SOLUTION INTRAMUSCULAR; INTRAVENOUS at 09:38

## 2022-06-03 RX ADMIN — DEXAMETHASONE SODIUM PHOSPHATE 4 MG: 4 INJECTION, SOLUTION INTRA-ARTICULAR; INTRALESIONAL; INTRAMUSCULAR; INTRAVENOUS; SOFT TISSUE at 09:58

## 2022-06-03 ASSESSMENT — ACTIVITIES OF DAILY LIVING (ADL)
ADLS_ACUITY_SCORE: 26

## 2022-06-03 NOTE — PHARMACY-CONSULT NOTE
Bariatric Consult    Medications evaluated as requested per Bariatric Consult. Patient may swallow tablets/capsules ONLY if less than   inch.  Larger tablets/capsules should be broken, crushed or split.    No medication changes were needed based on the Bariatric Medication Management Policy.    The pharmacist will continue to follow as new medications are ordered.

## 2022-06-03 NOTE — ANESTHESIA PROCEDURE NOTES
Airway       Patient location during procedure: OR       Procedure Start/Stop Times: 6/3/2022 9:42 AM  Staff -        CRNA: Tammy Hdz APRN CRNA       Other Anesthesia Staff: Mercedez Garcia       Performed By: SRNA  Consent for Airway        Urgency: elective  Indications and Patient Condition       Indications for airway management: sharon-procedural       Induction type:intravenous       Mask difficulty assessment: 1 - vent by mask    Final Airway Details       Final airway type: endotracheal airway       Successful airway: ETT - single and Oral  Endotracheal Airway Details        ETT size (mm): 7.5       Cuffed: yes       Successful intubation technique: direct laryngoscopy       DL Blade Type: Morton 2       Grade View of Cords: 2       Adjucts: stylet       Position: Right       Measured from: gums/teeth       Secured at (cm): 23       Bite block used: None    Post intubation assessment        Placement verified by: capnometry, equal breath sounds and chest rise        Number of attempts at approach: 1       Secured with: silk tape       Ease of procedure: easy       Dentition: Intact and Unchanged    Medication(s) Administered   Medication Administration Time: 6/3/2022 9:42 AM

## 2022-06-03 NOTE — ANESTHESIA CARE TRANSFER NOTE
Patient: Clyde Figueroa    Procedure: Procedure(s):  DUODENOJEJUNOSTOMY LAPAROSCOPIC;  HERNIORRHAPHY, HIATAL, LAPAROSCOPIC, GASTROPEXY       Diagnosis: Annular pancreas [Q45.1]  Hiatal hernia [K44.9]  Diagnosis Additional Information: No value filed.    Anesthesia Type:   General     Note:    Oropharynx: oropharynx clear of all foreign objects and spontaneously breathing  Level of Consciousness: awake  Oxygen Supplementation: nasal cannula  Level of Supplemental Oxygen (L/min / FiO2): 2  Independent Airway: airway patency satisfactory and stable  Dentition: dentition unchanged  Vital Signs Stable: post-procedure vital signs reviewed and stable  Report to RN Given: handoff report given  Patient transferred to: PACU    Handoff Report: Identifed the Patient, Identified the Reponsible Provider, Reviewed the pertinent medical history, Discussed the surgical course, Reviewed Intra-OP anesthesia mangement and issues during anesthesia, Set expectations for post-procedure period and Allowed opportunity for questions and acknowledgement of understanding      Vitals:  Vitals Value Taken Time   BP     Temp     Pulse     Resp     SpO2         Electronically Signed By: BROOKE Noguera CRNA  Jennifer 3, 2022  12:36 PM

## 2022-06-03 NOTE — ANESTHESIA PROCEDURE NOTES
Arterial Line Procedure Note    Pre-Procedure   Staff -        Performed By: anesthesiologist       Location: OR       Pre-Anesthestic Checklist: patient identified, IV checked, risks and benefits discussed, informed consent, monitors and equipment checked and pre-op evaluation  Timeout:       Correct Patient: Yes        Correct Procedure: Yes        Correct Site: Yes        Correct Position: Yes   Line Placement:   This line was placed Post Induction  Procedure   Procedure: arterial line       Laterality: left       Insertion Site: radial.  Sterile Prep        Standard elements of sterile barrier followed       Skin prep: Chloraprep  Insertion/Injection        Catheter Type/Size: 20 G, 1.75 in/4.5 cm quick cath (integral wire)  Narrative         Secured by: anchor securement device       Tegaderm dressing used.       Complications: None apparent,        Arterial waveform: Yes        IBP within 10% of NIBP: Yes

## 2022-06-03 NOTE — ANESTHESIA POSTPROCEDURE EVALUATION
Patient: Clyde Figueroa    Procedure: Procedure(s):  DUODENOJEJUNOSTOMY LAPAROSCOPIC;  HERNIORRHAPHY, HIATAL, LAPAROSCOPIC, GASTROPEXY       Anesthesia Type:  General    Note:  Disposition: Inpatient   Postop Pain Control: Uneventful            Sign Out: Well controlled pain   PONV: No   Neuro/Psych: Uneventful            Sign Out: Acceptable/Baseline neuro status   Airway/Respiratory: Uneventful            Sign Out: Acceptable/Baseline resp. status   CV/Hemodynamics: Uneventful            Sign Out: Acceptable CV status; No obvious hypovolemia; No obvious fluid overload   Other NRE: NONE   DID A NON-ROUTINE EVENT OCCUR? No           Last vitals:  Vitals Value Taken Time   /72 06/03/22 1430   Temp 36.7  C (98.1  F) 06/03/22 1430   Pulse 74 06/03/22 1440   Resp 9 06/03/22 1440   SpO2 94 % 06/03/22 1440   Vitals shown include unvalidated device data.    Electronically Signed By: Jesu Hatfield MD  Jennifer 3, 2022  2:41 PM

## 2022-06-03 NOTE — PHARMACY-ADMISSION MEDICATION HISTORY
Admission Medication History Completed by Pharmacy    See [facility] Admission Navigator for allergy information, preferred outpatient pharmacy, prior to admission medications and immunization status.     Medication History Sources:     Care everywhere    Surescripts    Patient medlist    Changes made to PTA medication list (reason):    Added: Pregabalin 50mg PO TID    Deleted: Carbidopa/Levodopa CR 25/100 1 TAB PO QHS    Changed: None    Additional Information:    Xarelto held for surgery    Prior to Admission medications    Medication Sig Last Dose Taking? Auth Provider   dulaglutide (TRULICITY) 1.5 MG/0.5ML pen INJECT 0.5ML SUBCUTANEOUSLY ONCE WEEKLY 5/24/2022 at Unknown time Yes Reported, Patient   dutasteride (AVODART) 0.5 MG capsule Take 0.5 mg by mouth every 48 hours  Past Week at Unknown time Yes Unknown, Entered By History   empagliflozin (JARDIANCE) 25 MG TABS tablet Take 25 mg by mouth Past Week at Unknown time Yes Reported, Patient   fish oil-omega-3 fatty acids 1000 MG capsule Take 1 capsule by mouth More than a month at Unknown time Yes Reported, Patient   LISINOPRIL PO Take by mouth daily  6/2/2022 at Unknown time Yes Reported, Patient   metFORMIN (GLUCOPHAGE) 500 MG/5ML SOLN Take by mouth 2 times daily (with meals) Past Week at Unknown time Yes Reported, Patient   metoprolol (LOPRESSOR) 25 MG tablet Take 1 tablet (25 mg) by mouth 2 times daily 6/2/2022 at Unknown time Yes Marisela Buckner APRN CNP   pantoprazole (PROTONIX) 40 MG enteric coated tablet Take 1 tablet (40 mg) by mouth daily 6/2/2022 at Unknown time Yes Rodney Montesinos MD   pregabalin (LYRICA) 50 MG capsule Take 50 mg by mouth 3 times daily  Yes Unknown, Entered By History   ROPINIROLE HCL PO Take 0.25 mg by mouth At Bedtime Past Week at Unknown time Yes Reported, Patient   simvastatin (ZOCOR) 40 MG tablet Take 1 tablet (40 mg) by mouth every evening Past Week at Unknown time Yes Marisela Buckner APRN CNP   tamsulosin (FLOMAX)  0.4 MG capsule TAKE 1 CAPSULE (0.4 MG) BY MOUTH EVERY MORNING. Past Week at Unknown time Yes Reported, Patient   VITAMIN D, CHOLECALCIFEROL, PO Take 2,000 Units by mouth daily  Past Week at Unknown time Yes Unknown, Entered By History   XARELTO ANTICOAGULANT 20 MG TABS tablet Take 20 mg by mouth daily Past Week at Unknown time Yes Reported, Patient   blood glucose (NO BRAND SPECIFIED) test strip 1 Strip by Misc.(Non-Drug; Combo Route) route.   Reported, Patient   CONTOUR NEXT TEST test strip USE TO TEST ONCE DAILY   Reported, Patient   Microlet Lancets MISC USE TO TEST ONCE DAILY   Reported, Patient       Date completed: 06/03/22    Medication history completed by: Donald Myers RP

## 2022-06-03 NOTE — BRIEF OP NOTE
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  Complications: None.  Implants: * No implants in log *      Large hiatal hernia, closed primarily.  Gastropexy to anterior abdominal wall  Loop duodenojejunostomy    Neil Eid MD   Surgery PGY-7

## 2022-06-03 NOTE — ANESTHESIA PREPROCEDURE EVALUATION
Anesthesia Evaluation     .             ROS/MED HX    ENT/Pulmonary:       Neurologic:     (+) TIA, features: No deficits.  CVA: no deficits.   Cardiovascular:     (+) hypertension-----      METS/Exercise Tolerance:     Hematologic:         Musculoskeletal:         GI/Hepatic:     (+) hiatal hernia,       Renal/Genitourinary:         Endo:         Psychiatric:         Infectious Disease:         Malignancy:       Other:                     Physical Exam  Normal systems: dental    Airway   Mallampati: II  TM distance: > 3 FB  Neck ROM: full  Mouth opening: > 3 cm    Dental     Cardiovascular   Rhythm and rate: regular and normal      Pulmonary    breath sounds clear to auscultation                    Anesthesia Plan     ASA Status:  2       Anesthesia Plan Type Discussed: General        Airway: ETT  Induction Technique/Agent: IntravenousMaintenance: Balanced.  Other Equipment: 2nd IV  PONV Management: Ondansetron (or other 5HT-3) and Dexamethasone or Solumedrol      Postoperative Care  Pain management: IV analgesics, Oral pain medications, Multi-modal analgesia.    Consents  Anesthetic plan, risks, benefits and alternatives discussed with:  Patient.  Use of blood products discussed: Yes. Use of blood products discussed: Yes.     - Discussed with: Patient.  .        ANESTHESIA PREOP EVALUATION    Procedure: Procedure(s):  DUODENOJEJUNOSTOMY LAPAROSCOPIC;  HERNIORRHAPHY, HIATAL, LAPAROSCOPIC    HPI: Clyde Figueroa is a 65 year old male presenting for above procedure. Has had previous anesthesia w/o complications.     Pt had EGD done last week during which the gastroenterologist found erythema at the distal esophagus which was negative Whelan's dysplasia. Small hiatal hernia was noted. Some residual scarring from well healed duodenal ulcers noted. 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 Pt is scheduled forMRCP and EUS for further  evaluation of duodenal stenosis and pancreas.     PMHx/PSHx/ROS:  Past Medical History:   Diagnosis Date     Diabetes mellitus (H)      Gastro-oesophageal reflux disease      History of blood transfusion      Hypertension      Pancreatic disease        Past Surgical History:   Procedure Laterality Date     APPENDECTOMY       ARTHROSCOPY SHOULDER ROTATOR CUFF REPAIR       ENDOSCOPIC RETROGRADE CHOLANGIOPANCREATOGRAM  5/14/2013    Procedure: ENDOSCOPIC RETROGRADE CHOLANGIOPANCREATOGRAM;  Endoscopic retrograde cholangiopancreatography. Balloon Dilation of Duodenal Stricture with  Endogastric Duodenoscopy;  Surgeon: Jhony Khan MD;  Location: [facility] OR     ENDOSCOPIC ULTRASOUND UPPER GASTROINTESTINAL TRACT (GI) N/A 7/30/2015    Procedure: ENDOSCOPIC ULTRASOUND, ESOPHAGOSCOPY / UPPER GASTROINTESTINAL TRACT (GI);  Surgeon: Darren Dye MD;  Location: [facility] OR     ESOPHAGOSCOPY, GASTROSCOPY, DUODENOSCOPY (EGD), COMBINED  4/10/2013    Procedure: COMBINED ESOPHAGOSCOPY, GASTROSCOPY, DUODENOSCOPY (EGD), BIOPSY SINGLE OR MULTIPLE;;  Surgeon: Bright Carl MD;  Location: [facility] GI     ESOPHAGOSCOPY, GASTROSCOPY, DUODENOSCOPY (EGD), COMBINED  4/30/2013    Procedure: COMBINED ESOPHAGOSCOPY, GASTROSCOPY, DUODENOSCOPY (EGD);;  Surgeon: Darren Mejía MD;  Location: [facility] GI     ESOPHAGOSCOPY, GASTROSCOPY, DUODENOSCOPY (EGD), COMBINED  5/28/2013    Procedure: COMBINED ESOPHAGOSCOPY, GASTROSCOPY, DUODENOSCOPY (EGD);  Upper Endoscopy with Balloon Dilation *Latex Safe*;  Surgeon: Jhony Khan MD;  Location: [facility] OR     ESOPHAGOSCOPY, GASTROSCOPY, DUODENOSCOPY (EGD), COMBINED  6/18/2013    Procedure: COMBINED ESOPHAGOSCOPY, GASTROSCOPY, DUODENOSCOPY (EGD);  Upper Endoscopy with esophageal dilation;  Surgeon: Jhony Khan MD;  Location: [facility] OR     ESOPHAGOSCOPY, GASTROSCOPY, DUODENOSCOPY (EGD), COMBINED N/A 4/4/2015    Procedure: COMBINED ESOPHAGOSCOPY, GASTROSCOPY, DUODENOSCOPY (EGD);  Surgeon:  Rodney Montesinos MD;  Location:  GI     ESOPHAGOSCOPY, GASTROSCOPY, DUODENOSCOPY (EGD), COMBINED Left 7/15/2015    Procedure: COMBINED ESOPHAGOSCOPY, GASTROSCOPY, DUODENOSCOPY (EGD), BIOPSY SINGLE OR MULTIPLE;  Surgeon: Rodney Montesinos MD;  Location:  GI     ESOPHAGOSCOPY, GASTROSCOPY, DUODENOSCOPY (EGD), COMBINED N/A 8/8/2018    Procedure: COMBINED ESOPHAGOSCOPY, GASTROSCOPY, DUODENOSCOPY (EGD), BIOPSY SINGLE OR MULTIPLE;  EGD;  Surgeon: Darren Dye MD;  [facility]     ESOPHAGOSCOPY, GASTROSCOPY, DUODENOSCOPY (EGD), COMBINED N/A 6/30/2021    Procedure: ESOPHAGOGASTRODUODENOSCOPY, WITH BIOPSY;  Surgeon: Darren Dye MD;  Location:  GI     HERNIA REPAIR           Past Anes Hx: No personal or family h/o anesthesia problems    Soc Hx:   Social History     Tobacco Use     Smoking status: Never Smoker     Smokeless tobacco: Never Used   Substance Use Topics     Alcohol use: Yes     Comment: Rare use       Allergies: No Known Allergies    Meds:   (Not in a hospital admission)      Current Outpatient Medications   Medication Sig Dispense Refill     blood glucose (NO BRAND SPECIFIED) test strip 1 Strip by Misc.(Non-Drug; Combo Route) route.       carbidopa-levodopa (SINEMET CR)  MG CR tablet Take 1 Tab by mouth Once Daily. TAKE 1 TABLET BY MOUTH DAILY       CONTOUR NEXT TEST test strip USE TO TEST ONCE DAILY       dulaglutide (TRULICITY) 1.5 MG/0.5ML pen INJECT 0.5ML SUBCUTANEOUSLY ONCE WEEKLY       dutasteride (AVODART) 0.5 MG capsule Take 0.5 mg by mouth every 48 hours        empagliflozin (JARDIANCE) 25 MG TABS tablet Take 25 mg by mouth       fish oil-omega-3 fatty acids 1000 MG capsule Take 1 capsule by mouth       LISINOPRIL PO Take by mouth daily        metFORMIN (GLUCOPHAGE) 500 MG/5ML SOLN Take by mouth 2 times daily (with meals)       metoprolol (LOPRESSOR) 25 MG tablet Take 1 tablet (25 mg) by mouth 2 times daily 60 tablet 0     Microlet Lancets MISC USE TO  TEST ONCE DAILY       pantoprazole (PROTONIX) 40 MG enteric coated tablet Take 1 tablet (40 mg) by mouth daily 90 tablet 0     ROPINIROLE HCL PO Take 0.25 mg by mouth At Bedtime       simvastatin (ZOCOR) 40 MG tablet Take 1 tablet (40 mg) by mouth every evening 30 tablet      tamsulosin (FLOMAX) 0.4 MG capsule TAKE 1 CAPSULE (0.4 MG) BY MOUTH EVERY MORNING.       VITAMIN D, CHOLECALCIFEROL, PO Take 2,000 Units by mouth daily        XARELTO ANTICOAGULANT 20 MG TABS tablet Take 20 mg by mouth daily         Physical Exam:  Vitals: There were no vitals taken for this visit.  BMI= There is no height or weight on file to calculate BMI.      Labs:  UPT: No results found for: HCGQUANT      BMP:  Recent Labs   Lab Test  04/07/15   0602   NA  141   POTASSIUM  3.6   CHLORIDE  109   CO2  26   BUN  6*   CR  0.92   GLC  187*   ANKIT  8.2*     CBC:   Recent Labs   Lab Test  04/07/15   1311  04/07/15   0602   WBC   --   5.7   RBC   --   3.22*   HGB  10.2*  9.4*   HCT   --   28.9*   MCV   --   90   MCH   --   29.2   MCHC   --   32.5   RDW   --   15.4*   PLT   --   216     Coags:  Recent Labs   Lab Test  04/03/15   2043   INR  1.15*       Assessment/Plan:  - ASA 3  - GETA with standard ASA monitors, IV induction, balanced anesthetic  - PIV  - Antibiotics per surgery  - PONV prophylaxis  - Blood products available, possible administration discussed with patient  - Relevant risks, benefits, alternatives and the anesthetic plan were discussed with patient/family or family representative.  All questions were answered and there was agreement to proceed.      Shira Coffey M.D.    7/29/2015  4:04 PM                    .    Anesthesia Evaluation            ROS/MED HX  ENT/Pulmonary:       Neurologic:     (+) TIA, features: No deficits.  CVA: no deficits.   Cardiovascular:     (+) hypertension-----    METS/Exercise Tolerance:     Hematologic:       Musculoskeletal:       GI/Hepatic:     (+) hiatal hernia,     Renal/Genitourinary:        Endo:       Psychiatric/Substance Use:       Infectious Disease:       Malignancy:       Other:              Physical Exam    Airway        Mallampati: II   TM distance: > 3 FB   Neck ROM: full   Mouth opening: > 3 cm    Respiratory Devices and Support         Dental  no notable dental history         Cardiovascular          Rhythm and rate: regular and normal     Pulmonary           breath sounds clear to auscultation             Anesthesia Plan    ASA Status:  2      Anesthesia Type: General.     - Airway: ETT   Induction: Intravenous.   Maintenance: Balanced.   Techniques and Equipment:     - Lines/Monitors: 2nd IV     Consents    Anesthesia Plan(s) and associated risks, benefits, and realistic alternatives discussed. Questions answered and patient/representative(s) expressed understanding.    - Discussed:     - Discussed with:  Patient    Use of blood products discussed: Yes.     - Discussed with: Patient.     Postoperative Care    Pain management: IV analgesics, Oral pain medications, Multi-modal analgesia.   PONV prophylaxis: Ondansetron (or other 5HT-3), Dexamethasone or Solumedrol     Comments:

## 2022-06-03 NOTE — PROVIDER NOTIFICATION
"Provider notified, \"7B. ANASTASIYA Figueroa (Rm [id number]) Elevated BP with post op vitals. 191/84 HR 85. 189/83 HR 85. I know pt received hydralizine in the PACU. I do not have hydralazine PRN order available. meli [id number]\"  Meli Don RN on 6/3/2022 at 6:10 PM    "

## 2022-06-04 LAB
GLUCOSE BLDC GLUCOMTR-MCNC: 133 MG/DL (ref 70–99)
GLUCOSE BLDC GLUCOMTR-MCNC: 147 MG/DL (ref 70–99)
GLUCOSE BLDC GLUCOMTR-MCNC: 153 MG/DL (ref 70–99)
GLUCOSE BLDC GLUCOMTR-MCNC: 163 MG/DL (ref 70–99)
GLUCOSE BLDC GLUCOMTR-MCNC: 175 MG/DL (ref 70–99)
HBA1C MFR BLD: 6.4 % (ref 0–5.6)
HOLD SPECIMEN: NORMAL

## 2022-06-04 PROCEDURE — 120N000002 HC R&B MED SURG/OB UMMC

## 2022-06-04 PROCEDURE — 250N000011 HC RX IP 250 OP 636

## 2022-06-04 PROCEDURE — 250N000012 HC RX MED GY IP 250 OP 636 PS 637: Performed by: SURGERY

## 2022-06-04 PROCEDURE — 258N000003 HC RX IP 258 OP 636: Performed by: SURGERY

## 2022-06-04 PROCEDURE — 250N000009 HC RX 250: Performed by: SURGERY

## 2022-06-04 PROCEDURE — 36415 COLL VENOUS BLD VENIPUNCTURE: CPT | Performed by: SURGERY

## 2022-06-04 PROCEDURE — 250N000011 HC RX IP 250 OP 636: Performed by: SURGERY

## 2022-06-04 PROCEDURE — 250N000013 HC RX MED GY IP 250 OP 250 PS 637: Performed by: SURGERY

## 2022-06-04 PROCEDURE — 83036 HEMOGLOBIN GLYCOSYLATED A1C: CPT | Performed by: SURGERY

## 2022-06-04 RX ORDER — DEXTROSE MONOHYDRATE, SODIUM CHLORIDE, AND POTASSIUM CHLORIDE 50; 1.49; 4.5 G/1000ML; G/1000ML; G/1000ML
INJECTION, SOLUTION INTRAVENOUS CONTINUOUS
Status: DISCONTINUED | OUTPATIENT
Start: 2022-06-04 | End: 2022-06-06

## 2022-06-04 RX ADMIN — HYDRALAZINE HYDROCHLORIDE 10 MG: 20 INJECTION INTRAMUSCULAR; INTRAVENOUS at 21:04

## 2022-06-04 RX ADMIN — ENOXAPARIN SODIUM 40 MG: 40 INJECTION SUBCUTANEOUS at 08:00

## 2022-06-04 RX ADMIN — ENALAPRILAT 1.25 MG: 1.25 INJECTION INTRAVENOUS at 22:58

## 2022-06-04 RX ADMIN — HYDROMORPHONE HYDROCHLORIDE 0.2 MG: 0.2 INJECTION, SOLUTION INTRAMUSCULAR; INTRAVENOUS; SUBCUTANEOUS at 12:12

## 2022-06-04 RX ADMIN — OXYCODONE HYDROCHLORIDE 10 MG: 10 TABLET ORAL at 08:00

## 2022-06-04 RX ADMIN — SIMVASTATIN 40 MG: 20 TABLET, FILM COATED ORAL at 20:46

## 2022-06-04 RX ADMIN — DOCUSATE SODIUM 50 MG AND SENNOSIDES 8.6 MG 2 TABLET: 8.6; 5 TABLET, FILM COATED ORAL at 20:46

## 2022-06-04 RX ADMIN — HYDROMORPHONE HYDROCHLORIDE 0.2 MG: 0.2 INJECTION, SOLUTION INTRAMUSCULAR; INTRAVENOUS; SUBCUTANEOUS at 09:01

## 2022-06-04 RX ADMIN — ROPINIROLE HYDROCHLORIDE 0.25 MG: 0.25 TABLET, FILM COATED ORAL at 22:58

## 2022-06-04 RX ADMIN — HYDRALAZINE HYDROCHLORIDE 10 MG: 20 INJECTION INTRAMUSCULAR; INTRAVENOUS at 17:26

## 2022-06-04 RX ADMIN — PANTOPRAZOLE SODIUM 40 MG: 40 TABLET, DELAYED RELEASE ORAL at 08:00

## 2022-06-04 RX ADMIN — ACETAMINOPHEN 650 MG: 325 TABLET ORAL at 17:26

## 2022-06-04 RX ADMIN — OXYCODONE HYDROCHLORIDE 5 MG: 5 TABLET ORAL at 23:12

## 2022-06-04 RX ADMIN — INSULIN ASPART 1 UNITS: 100 INJECTION, SOLUTION INTRAVENOUS; SUBCUTANEOUS at 12:08

## 2022-06-04 RX ADMIN — HYDROMORPHONE HYDROCHLORIDE 0.2 MG: 0.2 INJECTION, SOLUTION INTRAMUSCULAR; INTRAVENOUS; SUBCUTANEOUS at 17:25

## 2022-06-04 RX ADMIN — POTASSIUM CHLORIDE, DEXTROSE MONOHYDRATE AND SODIUM CHLORIDE: 150; 5; 450 INJECTION, SOLUTION INTRAVENOUS at 13:49

## 2022-06-04 RX ADMIN — TAMSULOSIN HYDROCHLORIDE 0.4 MG: 0.4 CAPSULE ORAL at 08:00

## 2022-06-04 RX ADMIN — METOPROLOL TARTRATE 25 MG: 25 TABLET, FILM COATED ORAL at 20:46

## 2022-06-04 RX ADMIN — DOCUSATE SODIUM 50 MG AND SENNOSIDES 8.6 MG 2 TABLET: 8.6; 5 TABLET, FILM COATED ORAL at 08:00

## 2022-06-04 RX ADMIN — METOPROLOL TARTRATE 25 MG: 25 TABLET, FILM COATED ORAL at 08:00

## 2022-06-04 RX ADMIN — OXYCODONE HYDROCHLORIDE 10 MG: 10 TABLET ORAL at 02:23

## 2022-06-04 RX ADMIN — INSULIN ASPART 1 UNITS: 100 INJECTION, SOLUTION INTRAVENOUS; SUBCUTANEOUS at 10:03

## 2022-06-04 ASSESSMENT — ACTIVITIES OF DAILY LIVING (ADL)
ADLS_ACUITY_SCORE: 26

## 2022-06-05 LAB
ANION GAP SERPL CALCULATED.3IONS-SCNC: 4 MMOL/L (ref 3–14)
ANION GAP SERPL CALCULATED.3IONS-SCNC: 9 MMOL/L (ref 3–14)
BUN SERPL-MCNC: 16 MG/DL (ref 7–30)
BUN SERPL-MCNC: 19 MG/DL (ref 7–30)
CALCIUM SERPL-MCNC: 8.4 MG/DL (ref 8.5–10.1)
CALCIUM SERPL-MCNC: 8.7 MG/DL (ref 8.5–10.1)
CHLORIDE BLD-SCNC: 106 MMOL/L (ref 94–109)
CHLORIDE BLD-SCNC: 106 MMOL/L (ref 94–109)
CO2 SERPL-SCNC: 25 MMOL/L (ref 20–32)
CO2 SERPL-SCNC: 28 MMOL/L (ref 20–32)
CREAT SERPL-MCNC: 0.79 MG/DL (ref 0.66–1.25)
CREAT SERPL-MCNC: 0.8 MG/DL (ref 0.66–1.25)
ERYTHROCYTE [DISTWIDTH] IN BLOOD BY AUTOMATED COUNT: 13.9 % (ref 10–15)
GFR SERPL CREATININE-BSD FRML MDRD: >90 ML/MIN/1.73M2
GFR SERPL CREATININE-BSD FRML MDRD: >90 ML/MIN/1.73M2
GLUCOSE BLD-MCNC: 150 MG/DL (ref 70–99)
GLUCOSE BLD-MCNC: 173 MG/DL (ref 70–99)
GLUCOSE BLDC GLUCOMTR-MCNC: 137 MG/DL (ref 70–99)
GLUCOSE BLDC GLUCOMTR-MCNC: 168 MG/DL (ref 70–99)
GLUCOSE BLDC GLUCOMTR-MCNC: 193 MG/DL (ref 70–99)
GLUCOSE BLDC GLUCOMTR-MCNC: 196 MG/DL (ref 70–99)
GLUCOSE BLDC GLUCOMTR-MCNC: 197 MG/DL (ref 70–99)
HCT VFR BLD AUTO: 43.2 % (ref 40–53)
HGB BLD-MCNC: 13.7 G/DL (ref 13.3–17.7)
MAGNESIUM SERPL-MCNC: 1.3 MG/DL (ref 1.6–2.3)
MAGNESIUM SERPL-MCNC: 1.8 MG/DL (ref 1.6–2.3)
MCH RBC QN AUTO: 28.1 PG (ref 26.5–33)
MCHC RBC AUTO-ENTMCNC: 31.7 G/DL (ref 31.5–36.5)
MCV RBC AUTO: 89 FL (ref 78–100)
PHOSPHATE SERPL-MCNC: 2.7 MG/DL (ref 2.5–4.5)
PLATELET # BLD AUTO: 243 10E3/UL (ref 150–450)
POTASSIUM BLD-SCNC: 4 MMOL/L (ref 3.4–5.3)
POTASSIUM BLD-SCNC: 4.2 MMOL/L (ref 3.4–5.3)
RBC # BLD AUTO: 4.87 10E6/UL (ref 4.4–5.9)
SODIUM SERPL-SCNC: 138 MMOL/L (ref 133–144)
SODIUM SERPL-SCNC: 140 MMOL/L (ref 133–144)
TSH SERPL DL<=0.005 MIU/L-ACNC: 0.98 MU/L (ref 0.4–4)
WBC # BLD AUTO: 11.9 10E3/UL (ref 4–11)

## 2022-06-05 PROCEDURE — 250N000013 HC RX MED GY IP 250 OP 250 PS 637: Performed by: SURGERY

## 2022-06-05 PROCEDURE — 250N000011 HC RX IP 250 OP 636

## 2022-06-05 PROCEDURE — 84100 ASSAY OF PHOSPHORUS: CPT

## 2022-06-05 PROCEDURE — 83735 ASSAY OF MAGNESIUM: CPT

## 2022-06-05 PROCEDURE — 36415 COLL VENOUS BLD VENIPUNCTURE: CPT | Performed by: SURGERY

## 2022-06-05 PROCEDURE — 258N000003 HC RX IP 258 OP 636

## 2022-06-05 PROCEDURE — 36415 COLL VENOUS BLD VENIPUNCTURE: CPT

## 2022-06-05 PROCEDURE — 93005 ELECTROCARDIOGRAM TRACING: CPT

## 2022-06-05 PROCEDURE — 250N000011 HC RX IP 250 OP 636: Performed by: SURGERY

## 2022-06-05 PROCEDURE — 93010 ELECTROCARDIOGRAM REPORT: CPT | Performed by: INTERNAL MEDICINE

## 2022-06-05 PROCEDURE — 84443 ASSAY THYROID STIM HORMONE: CPT | Performed by: SURGERY

## 2022-06-05 PROCEDURE — 83735 ASSAY OF MAGNESIUM: CPT | Performed by: SURGERY

## 2022-06-05 PROCEDURE — 999N000128 HC STATISTIC PERIPHERAL IV START W/O US GUIDANCE

## 2022-06-05 PROCEDURE — 80048 BASIC METABOLIC PNL TOTAL CA: CPT | Performed by: SURGERY

## 2022-06-05 PROCEDURE — 250N000009 HC RX 250

## 2022-06-05 PROCEDURE — 258N000003 HC RX IP 258 OP 636: Performed by: SURGERY

## 2022-06-05 PROCEDURE — 120N000003 HC R&B IMCU UMMC

## 2022-06-05 PROCEDURE — 85027 COMPLETE CBC AUTOMATED: CPT

## 2022-06-05 PROCEDURE — 80048 BASIC METABOLIC PNL TOTAL CA: CPT

## 2022-06-05 RX ORDER — METOPROLOL TARTRATE 25 MG/1
25 TABLET, FILM COATED ORAL ONCE
Status: COMPLETED | OUTPATIENT
Start: 2022-06-05 | End: 2022-06-05

## 2022-06-05 RX ORDER — POLYETHYLENE GLYCOL 3350 17 G/17G
17 POWDER, FOR SOLUTION ORAL DAILY
Status: DISCONTINUED | OUTPATIENT
Start: 2022-06-05 | End: 2022-06-06 | Stop reason: HOSPADM

## 2022-06-05 RX ORDER — METOPROLOL TARTRATE 1 MG/ML
5 INJECTION, SOLUTION INTRAVENOUS EVERY 10 MIN PRN
Status: DISCONTINUED | OUTPATIENT
Start: 2022-06-05 | End: 2022-06-06 | Stop reason: HOSPADM

## 2022-06-05 RX ORDER — METOPROLOL TARTRATE 1 MG/ML
5 INJECTION, SOLUTION INTRAVENOUS EVERY 4 HOURS PRN
Status: DISCONTINUED | OUTPATIENT
Start: 2022-06-05 | End: 2022-06-06

## 2022-06-05 RX ORDER — MAGNESIUM SULFATE HEPTAHYDRATE 40 MG/ML
2 INJECTION, SOLUTION INTRAVENOUS ONCE
Status: COMPLETED | OUTPATIENT
Start: 2022-06-05 | End: 2022-06-05

## 2022-06-05 RX ORDER — METOPROLOL TARTRATE 50 MG
50 TABLET ORAL 2 TIMES DAILY
Status: DISCONTINUED | OUTPATIENT
Start: 2022-06-05 | End: 2022-06-06 | Stop reason: HOSPADM

## 2022-06-05 RX ADMIN — ROPINIROLE HYDROCHLORIDE 0.25 MG: 0.25 TABLET, FILM COATED ORAL at 22:07

## 2022-06-05 RX ADMIN — SODIUM CHLORIDE, POTASSIUM CHLORIDE, SODIUM LACTATE AND CALCIUM CHLORIDE 500 ML: 600; 310; 30; 20 INJECTION, SOLUTION INTRAVENOUS at 03:41

## 2022-06-05 RX ADMIN — INSULIN ASPART 2 UNITS: 100 INJECTION, SOLUTION INTRAVENOUS; SUBCUTANEOUS at 13:01

## 2022-06-05 RX ADMIN — DOCUSATE SODIUM 50 MG AND SENNOSIDES 8.6 MG 2 TABLET: 8.6; 5 TABLET, FILM COATED ORAL at 20:17

## 2022-06-05 RX ADMIN — HYDRALAZINE HYDROCHLORIDE 10 MG: 20 INJECTION INTRAMUSCULAR; INTRAVENOUS at 23:56

## 2022-06-05 RX ADMIN — TAMSULOSIN HYDROCHLORIDE 0.4 MG: 0.4 CAPSULE ORAL at 10:01

## 2022-06-05 RX ADMIN — DUTASTERIDE 0.5 MG: 0.5 CAPSULE, LIQUID FILLED ORAL at 16:01

## 2022-06-05 RX ADMIN — MAGNESIUM SULFATE IN WATER 2 G: 40 INJECTION, SOLUTION INTRAVENOUS at 02:51

## 2022-06-05 RX ADMIN — ENOXAPARIN SODIUM 40 MG: 40 INJECTION SUBCUTANEOUS at 10:01

## 2022-06-05 RX ADMIN — POTASSIUM CHLORIDE, DEXTROSE MONOHYDRATE AND SODIUM CHLORIDE: 150; 5; 450 INJECTION, SOLUTION INTRAVENOUS at 21:49

## 2022-06-05 RX ADMIN — PANTOPRAZOLE SODIUM 40 MG: 40 TABLET, DELAYED RELEASE ORAL at 10:01

## 2022-06-05 RX ADMIN — METOPROLOL TARTRATE 25 MG: 25 TABLET, FILM COATED ORAL at 10:01

## 2022-06-05 RX ADMIN — AMIODARONE HYDROCHLORIDE 150 MG: 1.5 INJECTION, SOLUTION INTRAVENOUS at 05:21

## 2022-06-05 RX ADMIN — INSULIN ASPART 2 UNITS: 100 INJECTION, SOLUTION INTRAVENOUS; SUBCUTANEOUS at 09:53

## 2022-06-05 RX ADMIN — DOCUSATE SODIUM 50 MG AND SENNOSIDES 8.6 MG 2 TABLET: 8.6; 5 TABLET, FILM COATED ORAL at 10:01

## 2022-06-05 RX ADMIN — AMIODARONE HYDROCHLORIDE 1 MG/MIN: 50 INJECTION, SOLUTION INTRAVENOUS at 05:52

## 2022-06-05 RX ADMIN — METOPROLOL TARTRATE 5 MG: 5 INJECTION INTRAVENOUS at 02:27

## 2022-06-05 RX ADMIN — SIMVASTATIN 40 MG: 20 TABLET, FILM COATED ORAL at 20:17

## 2022-06-05 RX ADMIN — METOPROLOL TARTRATE 5 MG: 5 INJECTION INTRAVENOUS at 02:40

## 2022-06-05 RX ADMIN — METOPROLOL TARTRATE 50 MG: 50 TABLET, FILM COATED ORAL at 20:17

## 2022-06-05 RX ADMIN — RIVAROXABAN 20 MG: 10 TABLET, FILM COATED ORAL at 18:29

## 2022-06-05 RX ADMIN — METOPROLOL TARTRATE 25 MG: 25 TABLET, FILM COATED ORAL at 16:01

## 2022-06-05 RX ADMIN — INSULIN ASPART 2 UNITS: 100 INJECTION, SOLUTION INTRAVENOUS; SUBCUTANEOUS at 18:36

## 2022-06-05 ASSESSMENT — ACTIVITIES OF DAILY LIVING (ADL)
ADLS_ACUITY_SCORE: 26
ADLS_ACUITY_SCORE: 32
ADLS_ACUITY_SCORE: 26
ADLS_ACUITY_SCORE: 32
ADLS_ACUITY_SCORE: 26
ADLS_ACUITY_SCORE: 32
ADLS_ACUITY_SCORE: 26

## 2022-06-05 NOTE — PROVIDER NOTIFICATION
Clyde Figueroa 7b 233-1 /81 hydralazine given. BP 1/2 hour later was 171/71. has Vasotec PRN ordered with parameters of 170/100. do you want me to administer? Thx Elvia [id number]

## 2022-06-05 NOTE — PROVIDER NOTIFICATION
7b Clyde Figueroa 233-1 BP after bolus 151/86 . Do you want to give last dose of Metoprolol? Thx Elvia [id number]

## 2022-06-05 NOTE — PLAN OF CARE
Goal Outcome Evaluation:        Care for pt from [id number]    Neuro: A&Ox4. Calls as needded.  Cardiac: On amio drip convert spontaeously from AFIB rvr to NSR.  VSS.  Respiratory: Sating 94%>on RA.  GI/: Adequate urine output. No Bm this shift.  Diet/appetite: Tolerating full liquid diet. Eating well.  Activity:  Assist of 1, up to chair and in halls.  Pain: At acceptable level on current regimen.   Skin: No new deficits noted.  LDA's:2 PIV    Plan: Continue with POC. Notify primary team with changes.

## 2022-06-05 NOTE — PROVIDER NOTIFICATION
7b Clyde Figueroa 233-1 it has been over 1/2 hour since 2nd dose of metoprolol. HR still above 120. do you want to give third dose now? Thjo Gan [id number]

## 2022-06-06 ENCOUNTER — APPOINTMENT (OUTPATIENT)
Dept: CARDIOLOGY | Facility: CLINIC | Age: 72
DRG: 327 | End: 2022-06-06
Attending: SURGERY
Payer: COMMERCIAL

## 2022-06-06 VITALS
HEIGHT: 73 IN | TEMPERATURE: 98.8 F | RESPIRATION RATE: 16 BRPM | OXYGEN SATURATION: 99 % | WEIGHT: 156.4 LBS | DIASTOLIC BLOOD PRESSURE: 101 MMHG | BODY MASS INDEX: 20.73 KG/M2 | HEART RATE: 81 BPM | SYSTOLIC BLOOD PRESSURE: 179 MMHG

## 2022-06-06 LAB
ANION GAP SERPL CALCULATED.3IONS-SCNC: 5 MMOL/L (ref 3–14)
ATRIAL RATE - MUSE: 153 BPM
BUN SERPL-MCNC: 16 MG/DL (ref 7–30)
CALCIUM SERPL-MCNC: 8.7 MG/DL (ref 8.5–10.1)
CHLORIDE BLD-SCNC: 108 MMOL/L (ref 94–109)
CO2 SERPL-SCNC: 26 MMOL/L (ref 20–32)
CREAT SERPL-MCNC: 0.85 MG/DL (ref 0.66–1.25)
DIASTOLIC BLOOD PRESSURE - MUSE: NORMAL MMHG
GFR SERPL CREATININE-BSD FRML MDRD: >90 ML/MIN/1.73M2
GLUCOSE BLD-MCNC: 156 MG/DL (ref 70–99)
GLUCOSE BLDC GLUCOMTR-MCNC: 176 MG/DL (ref 70–99)
HOLD SPECIMEN: NORMAL
INTERPRETATION ECG - MUSE: NORMAL
LVEF ECHO: NORMAL
MAGNESIUM SERPL-MCNC: 1.9 MG/DL (ref 1.6–2.3)
P AXIS - MUSE: NORMAL DEGREES
POTASSIUM BLD-SCNC: 3.8 MMOL/L (ref 3.4–5.3)
PR INTERVAL - MUSE: NORMAL MS
QRS DURATION - MUSE: 82 MS
QT - MUSE: 260 MS
QTC - MUSE: 415 MS
R AXIS - MUSE: 28 DEGREES
SODIUM SERPL-SCNC: 139 MMOL/L (ref 133–144)
SYSTOLIC BLOOD PRESSURE - MUSE: NORMAL MMHG
T AXIS - MUSE: 227 DEGREES
VENTRICULAR RATE- MUSE: 153 BPM

## 2022-06-06 PROCEDURE — 93306 TTE W/DOPPLER COMPLETE: CPT

## 2022-06-06 PROCEDURE — 80048 BASIC METABOLIC PNL TOTAL CA: CPT

## 2022-06-06 PROCEDURE — 83735 ASSAY OF MAGNESIUM: CPT

## 2022-06-06 PROCEDURE — 250N000011 HC RX IP 250 OP 636: Performed by: SURGERY

## 2022-06-06 PROCEDURE — 258N000003 HC RX IP 258 OP 636

## 2022-06-06 PROCEDURE — 250N000013 HC RX MED GY IP 250 OP 250 PS 637: Performed by: SURGERY

## 2022-06-06 PROCEDURE — 250N000013 HC RX MED GY IP 250 OP 250 PS 637

## 2022-06-06 PROCEDURE — 93306 TTE W/DOPPLER COMPLETE: CPT | Mod: 26 | Performed by: INTERNAL MEDICINE

## 2022-06-06 PROCEDURE — 250N000011 HC RX IP 250 OP 636

## 2022-06-06 PROCEDURE — 36415 COLL VENOUS BLD VENIPUNCTURE: CPT

## 2022-06-06 RX ORDER — METOPROLOL TARTRATE 25 MG/1
50 TABLET, FILM COATED ORAL 2 TIMES DAILY
Qty: 60 TABLET | Refills: 0 | Status: SHIPPED | OUTPATIENT
Start: 2022-06-06

## 2022-06-06 RX ORDER — OXYCODONE HYDROCHLORIDE 5 MG/1
5 TABLET ORAL EVERY 6 HOURS PRN
Qty: 5 TABLET | Refills: 0 | Status: SHIPPED | OUTPATIENT
Start: 2022-06-06

## 2022-06-06 RX ADMIN — METOPROLOL TARTRATE 50 MG: 50 TABLET, FILM COATED ORAL at 08:50

## 2022-06-06 RX ADMIN — AMIODARONE HYDROCHLORIDE 0.5 MG/MIN: 50 INJECTION, SOLUTION INTRAVENOUS at 05:20

## 2022-06-06 RX ADMIN — ENOXAPARIN SODIUM 40 MG: 40 INJECTION SUBCUTANEOUS at 08:52

## 2022-06-06 RX ADMIN — TAMSULOSIN HYDROCHLORIDE 0.4 MG: 0.4 CAPSULE ORAL at 08:50

## 2022-06-06 RX ADMIN — POLYETHYLENE GLYCOL 3350 17 G: 17 POWDER, FOR SOLUTION ORAL at 08:51

## 2022-06-06 RX ADMIN — PANTOPRAZOLE SODIUM 40 MG: 40 TABLET, DELAYED RELEASE ORAL at 08:50

## 2022-06-06 RX ADMIN — INSULIN ASPART 1 UNITS: 100 INJECTION, SOLUTION INTRAVENOUS; SUBCUTANEOUS at 08:53

## 2022-06-06 ASSESSMENT — ACTIVITIES OF DAILY LIVING (ADL)
ADLS_ACUITY_SCORE: 30

## 2022-06-06 NOTE — PLAN OF CARE
Ambulating to bathroom, voiding well. Denying pain. SR on Amio gtt. Hydralazine given x1 per parameters.    Goal Outcome Evaluation:    Plan of Care Reviewed With: patient     Overall Patient Progress: improving    Outcome Evaluation: SR all shift

## 2022-06-06 NOTE — DISCHARGE SUMMARY
"[facility]   MIS Discharge Summary    Date of Admission: 6/3/2022  Date of Discharge: 6/6/2022    Admission Diagnosis:  1. Annular pancreas    Discharge Diagnosis:  1. Same as above    Consultations:  None    Procedures:  1. 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 He presented for elective bypass of the second portion of the duodenum via lap duodenojejunostomy.    Hospital Course:  The patient was admitted and underwent the above procedure. The patient tolerated the procedure well. There were no immediate complications. On post-operative day 2 the patient went into atrial fibrillation with RVR. He has a prior history of paroxysmal a fib and is on Xarelto for this. His rhythm was converted with amiodarone and his anticoagulation was restarted. His home dose of metoprolol was doubled for the remainder of his hospital stay. He stabilized nicely and did not have any further complications from this. An echocardiogram was obtained after conversion to sinus, to document a new baseline and ensure there were no new wall motion abnormalities. On the day of discharge he was ambulating, voiding, passing gas, and tolerating a full liquid diet, he was not requiring medications for pain. He was discharged home in stable / improving condition.    Discharge Physical Exam:  BP (!) 179/101 (BP Location: Right arm)   Pulse 81   Temp 98.8  F (37.1  C) (Oral)   Resp 16   Ht 1.854 m (6' 1\")   Wt 70.9 kg (156 lb 6.4 oz)   SpO2 99%   BMI 20.63 kg/m      Gen: NAD  Lungs: non-labored breathing  CV: regular rhythm, normal rate   Abd: soft, nondistended, appropriately tender, incisions are c/d/i  Ext: no peripheral edema  Neuro: AOx3    Meds:       Review of your medicines      START taking      Dose / Directions   oxyCODONE 5 MG tablet  Commonly known as: ROXICODONE  Used for: S/P " laparoscopic surgery      Dose: 5 mg  Take 1 tablet (5 mg) by mouth every 6 hours as needed for breakthrough pain  Quantity: 5 tablet  Refills: 0        CONTINUE these medicines which may have CHANGED, or have new prescriptions. If we are uncertain of the size of tablets/capsules you have at home, strength may be listed as something that might have changed.      Dose / Directions   metoprolol tartrate 25 MG tablet  Commonly known as: LOPRESSOR  This may have changed: how much to take  Used for: Paroxysmal atrial fibrillation (H)      Dose: 50 mg  Take 2 tablets (50 mg) by mouth 2 times daily  Quantity: 60 tablet  Refills: 0        CONTINUE these medicines which have NOT CHANGED      Dose / Directions   * blood glucose test strip  Commonly known as: NO BRAND SPECIFIED      1 Strip by Misc.(Non-Drug; Combo Route) route.  Refills: 0     * Contour Next Test test strip  Generic drug: blood glucose      USE TO TEST ONCE DAILY  Refills: 0     dutasteride 0.5 MG capsule  Commonly known as: AVODART      Dose: 0.5 mg  Take 0.5 mg by mouth every 48 hours  Refills: 0     empagliflozin 25 MG Tabs tablet  Commonly known as: JARDIANCE      Dose: 25 mg  Take 25 mg by mouth  Refills: 0     fish oil-omega-3 fatty acids 1000 MG capsule      Dose: 1 capsule  Take 1 capsule by mouth  Refills: 0     LISINOPRIL PO      Take by mouth daily  Refills: 0     metFORMIN 500 MG/5ML Soln solution  Commonly known as: GLUCOPHAGE      Take by mouth 2 times daily (with meals)  Refills: 0     Microlet Lancets Misc      USE TO TEST ONCE DAILY  Refills: 0     pantoprazole 40 MG EC tablet  Commonly known as: PROTONIX  Used for: Gastroesophageal reflux disease with esophagitis      Dose: 40 mg  Take 1 tablet (40 mg) by mouth daily  Quantity: 90 tablet  Refills: 0     pregabalin 50 MG capsule  Commonly known as: LYRICA      Dose: 50 mg  Take 50 mg by mouth 3 times daily  Refills: 0     ROPINIROLE HCL PO      Dose: 0.25 mg  Take 0.25 mg by mouth At  Bedtime  Refills: 0     simvastatin 40 MG tablet  Commonly known as: ZOCOR      Dose: 40 mg  Take 1 tablet (40 mg) by mouth every evening  Quantity: 30 tablet  Refills: 0     tamsulosin 0.4 MG capsule  Commonly known as: FLOMAX      TAKE 1 CAPSULE (0.4 MG) BY MOUTH EVERY MORNING.  Refills: 0     Trulicity 1.5 MG/0.5ML pen  Generic drug: dulaglutide      INJECT 0.5ML SUBCUTANEOUSLY ONCE WEEKLY  Refills: 0     VITAMIN D (CHOLECALCIFEROL) PO      Dose: 2,000 Units  Take 2,000 Units by mouth daily  Refills: 0     XARELTO ANTICOAGULANT 20 MG Tabs tablet  Generic drug: rivaroxaban ANTICOAGULANT      Dose: 20 mg  Take 20 mg by mouth daily  Refills: 0         * This list has 2 medication(s) that are the same as other medications prescribed for you. Read the directions carefully, and ask your doctor or other care provider to review them with you.               Where to get your medicines      These medications were sent to [facility] - [address] [address]    Phone: [phone]     metoprolol tartrate 25 MG tablet    oxyCODONE 5 MG tablet         Additional instructions:  After Care     Future Labs/Procedures    Activity     Comments:    Your activity upon discharge: No heavy lifting greater than 20 pounds.    Diet     Comments:    Follow this diet upon discharge:         Full Liquid Diet    Discharge Instructions     Comments:    After Bariatric Surgery Discharge Instructions  [facility]     Note: Ask your nurse to order your medications from the pharmacy. Be sure you have your medications with you when you leave.  Diet on discharge: Full liquid diet until seen by Dr. Zuniga.      How much fluid should I drink?  Strive for 48-64 ounces daily.  Carry a water bottle with you without a straw or sports top. Drink from it often.  Keep track of how much fluid you drink in a day.  Remember:  -Do not use straws,  chew gum or suck on hard candies. They may cause painful gas.    -Sip, don't slurp when you drink.    -Practice small sips using a medicine cup for the first week postop.   -No ice or cold drinks. This could cause gas or spasms.   -No coffee, soda pop or drinks with caffeine. These may cause stomach pain.   -No alcohol. It is bad for your liver and will cause stomach pain.    How often should I do my deep breathing and coughing?  Use your incentive spirometer (small plastic breathing device) every hour while awake after you get home. Using the incentive spirometer helps you deep breath. Continuing to cough and deep breath will help prevent fevers and pneumonia.   If you do not have a fever after one week, you can stop using the incentive spirometer and discard it.     You can continue to take deep breaths without the incentive spirometer every one to two hours while awake for the month after surgery.    What kinds of activity can I do?  Get plenty of rest the first few days after surgery and try to balance rest and activity. You will need some time to recover - you may be more tired than you realize at first. You'll feel better and heal faster if you take good care of yourself.  For 4 weeks after surgery (Please review restrictions at your one week visit, they could change based on how well you are doing):  -Don't lift more than 20 pounds.   -Take 4-5 short walks every day.  -Don't jog, run, or do belly exercises.  Don't swim, bathe or use a hot tub until your cuts are healed (scabs are gone).  You may shower  Don't plan to fly or take a road trip within the first 1 to 3 months after surgery.  You could get a blood clot in your legs. If you must travel, get up and move around every hour for at least 5 minutes before continuing on your journey.  Your care team can help you decide when it's safe for you to travel.     What can I do for pain control?  You had major belly surgery that involves all layers of your belly  muscles. Pain is expected, even for some as far out as 6-8 weeks after surgery. Moving, sneezing, coughing, and breathing will cause discomfort because these activities use your belly muscles.   Please see your after visit summary medication review for what pain medication will be continued, discontinued and newly started for you.    You can take opioid pain medicine if prescribed and if needed. Try to wean off from it as soon as you feel comfortable.   Do not drive while you are taking opioid pain medicine. This is dangerous.  You can take acetaminophen (Tylenol) between your prescribed pain medicine on a scheduled basis OR take it scheduled every 6 hours (check with your care team for specifics).  -Do not take more than 3000 mg Tylenol in a 24 hour period.  -You may also take Tylenol for pain in place of the opioid pain medicine (check with your care team for specifics).   You can apply ice or heat to the affected area(s). Just remember to wrap the ice in something and limit icing sessions to 20 minutes. Excessive icing can irritate the skin or cause skin damage.  You can apply heat with a hot, wet towel or heating pad. Just like cold therapy, limit heat application to 20 minutes. Never sleep with a heating pad on. It could cause severe burns to your skin.  Wear your binder to support your belly muscles if you have one.  Take this off a little more each day and try to be off completely by 2 weeks after surgery. If you don't need your binder for comfort or support, you don't need to wear it.   You may not be able to sleep in a comfortable position for a few weeks after surgery. This is normal. You may be more comfortable sleeping in a recliner or propped up with 3 pillows for the first couple of weeks after surgery.  Do not take NSAID's (Non-Steroidal Anti-Inflammatory Drugs) (examples: ibuprofen, Motrin, Advil, Aleve, and Naproxen), aspirin, or use pain patches with NSAID's. They will increase your risk of  bleeding or getting an ulcer.    Please call the clinic for any of the following pain concerns, we would like to talk to you:  -pain that does not improve with rest  -pain that gets worse and worse  -pain that is not controlled by your pain medicine  -a sudden severe increase in pain    What should I know about my incisions (cuts)?  Your incisions are covered with white steri strips or butterfly tape and have band aids or gauze over the top. If you have gauze or band aids, they can come off in the hospital.  Leave your steri strips on until they fall off on their own. If the steri strips don't fall off after 1 to 2 weeks, you can take them off. If they fall off earlier, replace them with clean band aids trying to avoid touching the incision itself.   If you have gauze covered by a clear dressing, remove 2-3 days after surgery or as directed by your care team.  You may shower in the hospital after surgery and can get your incision coverings wet.  Do not submerge in water (e.g. No baths, swimming pools, hot tubs) until your care team tells you it is okay and your incisions are completely healed.    Call the clinic if you have any of these signs or symptoms of infection:  -Redness around the site.  -Drainage that smells bad.  -Drainage that is thick yellow or green.  -An increase in pain around the incision site  -An increase in swelling around the incision site  -Heat or warmth around incision site   -Fever of 101.5  F (38.3  C) or higher when taken under the tongue.    -Chills    Will my urine or bowel movements change?   Your first bowel movements (stools) will likely be liquid. You may also notice old blood or a darker color (black or maroon color) in your bowel movements.  This is not unusual and usually goes away after the first week, if not sooner. You may not have a bowel movement for a week.   If you have not had a bowel movement for at least three days after your surgery date and are passing gas, you can use  "over the counter stool softeners.  Please stop taking the stool softeners and laxatives if your stools are loose.  Increasing fluids and activity as well as getting off narcotics will help prevent constipation.    Call the clinic if:   -You have stomach pain.   -You continue to have constipation.  -You have excessive bloating after walking and passing gas.    How can I prevent dehydration if I feel nauseated (sick to my stomach) and vomit (throw up)?   Vomiting is not normal after surgery. If you continue to have nausea and vomiting, call the clinic.   Nausea can be a sign of dehydration. That is why it is very important to track your fluids.  Do not nap more than one hour during the day. Set a timer to wake yourself up, if needed. Too much sleep will keep you from drinking enough fluid during the day and lead to dehydration.  No outside activity in hot, humid weather until you can drink 48 to 64 ounces of fluid in 24 hours. If you sweat a lot, your body may lose too much water.  Try to take a 1 ounce sip of water (one medicine cup) every 15 minutes.  Set a timer to remind yourself.    Call the clinic if you have any of these signs or symptoms of dehydration:  -Dark colored urine  -Urinating (pass water) less than 2-3 times per day  -Lack of energy  -Nausea  -Dizziness  -Headache    Call the clinic ANY TIME at [phone] if:  -Your pain medicine is not working.  -You have a fever = 101.5 F.  -You have belly or left shoulder pain that gets worse and worse.  -You have a swollen leg with redness, warmth, or pain behind the knee or calf.  -You have chest pain   -You feel very short of breath.  -You have a sudden severe increase in heart rate.  -You have vomiting that gets worse and worse.  -You have constant nausea (feeling sick to your stomach) that does not go away with medication.  -You have trouble swallowing.  -You have an increasing feeling that \"something is not right\".  -You have hiccups that do not " stop.  -You have any questions or concerns.    AFTER HOURS QUESTIONS OR CONCERNS: Call [phone] and ask to speak with surgery resident if you are having troubles in the evenings, at night, or on weekends. Please call if you experience increasing abdominal pain, nausea, vomiting, increasing drainage from your wounds, chills, or fever >101.5    If you have to go to the Emergency Room, we prefer you go to the hospital that did your surgery. Please let them know that you had bariatric surgery and to notify your surgeon.    When should I go back to the clinic?  Follow up with your care team in 1-2 weeks.   If this appointment was not already made, please call: [phone]    Appointments located at [facility] clinic:  [facility] [place] [place]    [address] [place]  [place] [address]              Follow Up:  -Follow up with Dr Zuniga in clinic in 2-3 week(s) after discharge.       BARIATRIC PATIENTS:  Call [phone] to schedule or to reach your care team    GENERAL SURGERY PATIENTS: Call [phone] for scheduling needs or to reach your care team      Neil Eid MD   Surgery PGY-7

## 2022-06-06 NOTE — PROGRESS NOTES
"Admission          Date:06/05/22 Time:0530  -----------------------------------------------------------  Reason for admission: pt was on 7B and went into Afib with RVR rates 160s  Primary team notified of pt arrival.  Admitted from:7b  Via: bed   Accompanied by:  2 RNs  Belongings: Placed in closet; valuables sent home with family  Admission Profile: complete  Teaching: orientation to unit and call light- call light within reach, call don't fall, use of console, meal times, when to call for the RN, and enforced importance of safety   Access: one PIV  Telemetry:Placed on pt  Ht./Wt.: complete  Code Status verified on armband: yes  2 RN Skin Assessment Completed By:  Ephraim RN & Erik RN--- 6 Lap sites on abd, groin and buttocks is red and blanchable, mepilex in place.   Med Rec completed: no  Bed surface reassessed with algorithm and charted: no  New bed surface ordered: no  Suction/Ambu bag/Flowmeter at bedside: yes  Is patient having diarrhea upon admission- if YES fill out testing algorithm : no    C. Diff Testing Algorithm (MUST be marked YES)   1. 3 or more loose stools in 24 hrs. [] Yes [] No       Additional symptoms:(At least ONE must be marked yes)   1. Abdominal pain/discomfort [] Yes [] No   2. Fever at least 38C (100.4 F) [] Yes [] No   3. Elevated WBC(>11,000) [] Yes [] No       Exclusion Criteria:  (MUST be marked YES)   1. Off laxatives for at least 48 hrs. [] Yes [] No       Pt status:  A&O x4, room air, denied pain. A fib RVR- amio gtt running at 33.3mL/hr   Blood pressure 136/81, pulse (!) 122, temperature 98.6  F (37  C), temperature source Oral, resp. rate 18, height 1.854 m (6' 1\"), weight 71.2 kg (156 lb 15.5 oz), SpO2 94 %.    Hours of care [id number]    Pt S/O called this AM and was updated on pt condition.     Erik Bhat RN on 6/5/2022 at 7:05 AM      "
"BP (!) 151/86   Pulse (!) 142   Temp 99.3  F (37.4  C) (Oral)   Resp 18   Ht 1.854 m (6' 1\")   Wt 71.2 kg (156 lb 15.5 oz)   SpO2 96%   BMI 20.71 kg/m       Neuro: A&Ox4, can make needs known  Respiratory No c/o SOB,   Cardiac: HPTN Hydralazine  and Enalaprilat administered. A-Fib RVR 2 doses of IV metoprolol administered with no effect.    GI/: +BS, voiding spont with some hesitancy  Diet: FLD  Pain: Controlled with oral Oxy  Incisions/Drains: 5 Laps sites. Primapore dressings. Scant old drainage.   IV Access: Rt PIV Leaking and pulled. L PIV infusing maintenance.     Labs: Mag 1.3- replaced.   Activity: A1 to bathroom.        New changes this shift: A-Fib RVR-  Plan:  transferred to 6b 31-2 @ 1266       "
"BP (!) 152/70 (BP Location: Left arm)   Pulse 76   Temp 98.9  F (37.2  C) (Oral)   Resp 16   Ht 1.854 m (6' 1\")   Wt 71.2 kg (156 lb 15.5 oz)   SpO2 92%   BMI 20.71 kg/m      Neuro: a&ox4, can make needs known.   Respiratory WDL  Cardiac: HPTN within parameters   GI/: voids spont, +BS, Passing flatus.   Diet: FLD  Pain: controlled with oral oxy and tylenol overnight.   Incisions/Drains: 6 lap sites, shadow drainage.   IV Access: PIVx2 L-infusing LR@75, R-SL  Labs: Reviewed.   Activity: A1       New changes this shift: Pt able to void spont.   Plan:  Continue with  Plan of care.  "
"Bariatric Surgery Progress Note    Flipped into afib with RVR overnight and was transferred to  for monitoring. Metoprolol ineffective now on amiodarone drip. He has been asymptomatic from these elevated rates. Continues to have issues with dysphagia. Passing gas. Pain controlled.    BP (!) 131/99 (BP Location: Right arm)   Pulse (!) 144   Temp 98.3  F (36.8  C) (Oral)   Resp 18   Ht 1.854 m (6' 1\")   Wt 71.2 kg (156 lb 15.5 oz)   SpO2 99%   BMI 20.71 kg/m      Laying in bed in NAD  Irregularly irregular pulse. HR 110s-120s on my exam  Non-labored breathing on RA  Abdomen is soft, non-distended, and minimally tender.     I/O last 3 completed shifts:  In: 469.17 [P.O.:360; I.V.:109.17]  Out: 1800 [Urine:1800]    WBC: 11.9  Ma.3 (2 grams given after this lab)  Other electrolytes normal    72 year old man POD 2 s/p lap hiatal hernia repair and duodenojejunostomy.     - Will run out the amio drip  - Home metoprolol  - Will see later today and consider cardiology consultation if still quite high.   - Liquid diet  - Encourage ambulation  - Dispo pending improvement in HR and dysphagia    Everardo Padilla  General Surgery PGY-5  [phone]    Addendum  Patient has converted back to sinus. Had discussion with EP regarding his care. Plan to increase metoprolol to 50 BID, get TSH and echo (do not have in our system). Will continue his xarelto and plan to have him follow up with cardiology in outpatient setting.     "
"Bariatric Surgery Progress Note    No acute events overnight. Patient reports feeling ok. Has had a bit of juice so far since surgery. No nausea.     BP (!) 152/70 (BP Location: Left arm)   Pulse 76   Temp 98.9  F (37.2  C) (Oral)   Resp 16   Ht 1.854 m (6' 1\")   Wt 71.2 kg (156 lb 15.5 oz)   SpO2 92%   BMI 20.71 kg/m      Laying in bed in NAD  Non-labored breathing on RA  Abdomen is soft and non-distended.   Appropriately tender around incisions  Incisional dressings clean    I/O last 3 completed shifts:  In: 1500 [I.V.:1500]  Out: 1415 [Urine:1400; Blood:15]    72 year old man who is POD 1 s/p laparoscopic hiatal hernia repair and duodenojejunostomy for annular pancreas. He is doing well.     - Full liquid diet for 2 weeks  - PPI  - metoprolol  - Stop IVF  - PRN tylenol and oxycodone for pain  - Discharge perhaps later today vs tomorrow    Everardo Padilla  General Surgery PGY-5  [phone]    Addendum  Rechecked patient in afternoon. Having some difficulty with PO intake secondary to pain. Expect this will improve as edema from hernia repair subsides. Will keep in hospital for today and continue some IVF.   "
"Brief Cross Cover Note    Paged about HR of 130-140s.     Went to go evaluate patient.  Reports about 1-1.5 hours ago starting to feel heart palpitations. Denies chest pain, shortness of breath, lightheadedness, or any other symptoms. Overnight has beeb hypertensive with sbps 150s-190s, requiring prn enalaprilat and hydralazine. Reports poor PO intake today. UOP 1750cc yesterda/24hry in addition to 600cc since midnight.     BP (!) 150/88 (BP Location: Left arm, Patient Position: Semi-Lofton's, Cuff Size: Adult Regular)   Pulse (!) 142   Temp 99.4  F (37.4  C)   Resp 18   Ht 1.854 m (6' 1\")   Wt 71.2 kg (156 lb 15.5 oz)   SpO2 97%   BMI 20.71 kg/m    On 2L NC.     General: NAD, resting in bed comfortably  Resp: nonlabored breathing on 2L NC  CV: tachycardic  Abdomen: nondistended    EKG obtained which shows Afib RVR.     Plan:  - IV metoprolol 5mg x2 and monitor need for additional dose  - Stat labs (CBC, BMP, Mag, Phos) -> Ordered 2g IV magnesium  - LR bolus 500ml    - - - - - - - - - - - - - - - - - -  Ruel Recinos MD  [facility] General Surgery PGY-1  06/05/2022    See [facility] for on-call pager information: [facility] Paging/Directory - Surgery General /[facility]      Update: No response to above interventions. Will start amiodarone (bolus followed by infusion) and transfer to intermediate care with telometry. Discussed with esha resident and updated patient.       "
Admitted/transferred from: PACU   2 RN full   skin assessment completed by Aggie Don, RN and Derrick RN.  Skin assessment finding: issues found 6 lab sites covered with small mepilex with scant drainage. mepilex to sacrum intact.    Interventions/actions: skin interventions none needed at this time.      Bedside Emergency Equipment Present:  Suction Regulator: Yes  Suction Canister: No  Tubing between Regulator and Canister: No  O2 Regulator with Tree: Yes  Ambu Bag: Yes     Personal belongings, cellular phone (screen cracked), no home meds, clothing.     
DISCHARGE                         No discharge date for patient encounter.  ----------------------------------------------------------------------------  Discharged to: Home  Via: private transportation  Accompanied by:  Partner  Discharge Instructions: *diet, *activity, medications, follow up appointments, when to call the MD, aftercare instructions.  Prescriptions:  Filled by pharmacy; medication list reviewed, Pt will  meds on the way out, transport team notified.   Follow Up Appointments:    arranged; information given  Belongings:   All sent with pt  IV:   d/c'd  Telemetry:   d/c'd  Pt exhibits understanding of above discharge instructions; all questions answered.  Abdominal x5 sites with staples, staples were removed, steri-strips applied, Pt tolerated well, no drainage/bleeding noted. Lap Sited C/D/I.    Discharge Paperwork: Signed, copied, and sent home with patient.     
Transferred pt to 6B via bed on monitor with 2 Resource Floats after starting the Amiodarone bolus.   
Vitals: Temp: 98.1  F (36.7  C) Temp src: Oral BP: (!) 182/82 Pulse: 82   Resp: 19 SpO2: 97 % O2 Device: Nasal cannula Oxygen Delivery: 2 LPM     Time: 1553- 1930  Reason for admission: POD#0 DUODENOJEJUNOSTOMY LAPAROSCOPIC;  HERNIORRHAPHY, HIATAL, LAPAROSCOPIC, GASTROPEXY.   Activity:  not OOB. Ax2 with in bed mobility and repositioning.   Pain:  8/10 to incisional sites on the ABD.   Neuro: A&O x4. Able to make needs known.   Cardiac: HTN.   Respiratory:  WDL, no cough.   GI/:  WDL, denies N/V.   Diet: Bariatric Diet Full Liquids   Lines:  PIV x2 (l&R). (L) infusing LR at 75cc/hr. (R) saline locked.   Incisions/Drains/Skin:  Sacral mepilex intact for protection. 6 small primapore in place with scant drainage.   Lab:  Reviewed.   Electrolyte Replacements: N/A  New changes this shift: Hydralazine administered for HTN. MD team notified.     
Vitals: Temp: 98.5  F (36.9  C) Temp src: Oral BP: (!) 158/79 Pulse: 78   Resp: 16 SpO2: 97 % O2 Device: None (Room air) Oxygen Delivery: 2 LPM     Time: POD#1 DUODENOJEJUNOSTOMY LAPAROSCOPIC;  HERNIORRHAPHY, HIATAL, LAPAROSCOPIC, GASTROPEXY.   Activity:  SBA. Independent with in bed mobility and repositioning.   Pain:  8/10 to incisional sites on the ABD. Hiatal hernia repair site. Administered dilaudid, oxycodone per MAR.  Neuro: A&O x4. Able to make needs known.   Cardiac: HTN. denies cardiac chest pain.   Respiratory:  WDL, no cough. Uses continuous oxygen via NC at 2L.   GI/:  WDL, denies N/V. Voids spontaneously, denies pain/burning with urination. Reports no flatus, no BM.   Diet: Full Liquids, unable to tolerate, reports pain after eatiing a few bites of cream of wheat, able to tolerate juice.  Lines:  PIV x2 (L&R). (L) infusing dextrose 5% and 0.45% NaCl + KCl 20 mEq/L infusion at 50 cc/hr.  (R)saline locked, dressing changed.   Incisions/Drains/Skin:  Sacral mepilex intact for protection. 6 small primapore in place with scant drainage.   Lab:  Reviewed.   Electrolyte Replacements: N/A  New changes this shift: one time hydralazine administered.    
independent

## 2022-06-06 NOTE — PLAN OF CARE
D: 6/1 Hiatial hernia repair, transferred to  6/4/22 for Afib RVR.     I: Monitored vitals and assessed pt status.   Changed:   Running: Amiodarone 0.5 mg/min; D5+.45 NS w/ Kcl at 50 mL/hr  PRN:   Tele: NS  O2: RA 93%  Mobility: SBA    A: A0x4. VSS. Afebrile.     P: Continue Amiodarone, consider meds to control AFib    Temp:  [97.5  F (36.4  C)-99.4  F (37.4  C)] 99.2  F (37.3  C)  Pulse:  [] 80  Resp:  [16-20] 18  BP: (111-176)/(74-99) 168/88  SpO2:  [93 %-99 %] 93 %       Goal Outcome Evaluation:    Plan of Care Reviewed With: patient     Overall Patient Progress: improving

## 2022-06-07 ENCOUNTER — PATIENT OUTREACH (OUTPATIENT)
Dept: CARE COORDINATION | Facility: CLINIC | Age: 72
End: 2022-06-07
Payer: COMMERCIAL

## 2022-06-07 DIAGNOSIS — Z71.89 OTHER SPECIFIED COUNSELING: ICD-10-CM

## 2022-06-07 NOTE — PROGRESS NOTES
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 He presented for elective bypass of the second portion of the duodenum via lap duodenojejunostomy.    ASSESSMENT      Enrollment  Primary Care Care Coordination Status: Not a Candidate    Discharge Assessment  How are you doing now that you are home?: \"Good, so far. No problems yet.\"  How are your symptoms? (Red Flag symptoms escalate to triage hotline per guidelines): Improved  Do you feel your condition is stable enough to be safe at home until your provider visit?: Yes  Does the patient have their discharge instructions? : Yes  Does the patient have questions regarding their discharge instructions? : No  Were you started on any new medications or were there changes to any of your previous medications? : Yes  Does the patient have all of their medications?: Yes  Do you have questions regarding any of your medications? : No  Do you have all of your needed medical supplies or equipment (DME)?  (i.e. oxygen tank, CPAP, cane, etc.): Yes  Discharge follow-up appointment scheduled within 14 calendar days? : No  Is patient agreeable to assistance with scheduling? : No (Patient confirmed that he has the phone number to call to schedule a follow up appointment)    Post-op ([facility] CTA Only)  If the patient had a surgery or procedure, do they have any questions for a nurse?: No      PLAN                                                      Outpatient Plan:      Follow up with your care team " in 1-2 weeks.    No future appointments.      For any urgent concerns, please contact our 24 hour nurse triage line: [phone] ([phone])       Natalie Johnson  Community Health Worker  [facility]  Ph: [phone]

## 2022-06-08 ENCOUNTER — PATIENT OUTREACH (OUTPATIENT)
Dept: ENDOCRINOLOGY | Facility: CLINIC | Age: 72
End: 2022-06-08
Payer: COMMERCIAL

## 2022-06-08 NOTE — PROGRESS NOTES
RN Post-Op/Post-Discharge Care Coordination Note    Mr. Clyde Figueroa is a 72 year old male who underwent   DUODENOJEJUNOSTOMY LAPAROSCOPIC; N/A General   HERNIORRHAPHY, HIATAL, LAPAROSCOPIC, GASTROPEXY        on 6/3/22 with  Dr. Shawn Zuniga.  Spoke with Patient.    Support  Patient able to care for self independently     Health Status  Nausea/Vomiting: Patient denies nausea/vomiting.  Eating/drinking: Patient is able to eat and drink without any complaints. - Liquids only.  Bowel habits: Patient reports having a normal bowel movement.  Drains (ALVIN): N/A  Fevers/chills: Patient denies any fever or chills.  Incisions: Patient denies any signs and symptoms of infection..  Wound closure:  Skin Sealant  Pain: Denies pain  New Medications:  oxycodone    Activity/Restrictions  No lifting in excess of 15-20 pounds for 3-4 weeks    Equipment  None    Pathology reviewed with patient:  N/A    Forms/Letters  No    All of his questions were answered including reviewing restrictions, and wound care.  He will call this office if he has any further questions and/or concerns.      Patient will call to make his follow up appt.  Needs to be seen in 2-3 weeks.  Can be in person or virtual.    A copy of this note was routed to the primary surgeon.      Whom and When to Call  Patient acknowledges understanding of how to manage any medication changes and   when to seek medical care.     Patient advised that if after hour medical concerns arise to please call [phone] and choose option 4 to speak to the physician on call.

## 2022-06-14 NOTE — PROGRESS NOTES
Virtual Visit Check-In    During this virtual visit the patient is located in [place], patient verifies this as the location during the entirety of this visit.     Clyde is a 72 year old who is being evaluated via a billable video visit.      How would you like to obtain your AVS? MyChart  If the video visit is dropped, the invitation should be resent by: Send to e-mail at: [email or web address]  Will anyone else be joining your video visit? No    Video Start Time: 1:47 PM    Video-Visit Details    Type of service:  Video Visit    Video End Time:155    Originating Location (pt. Location): Home    Distant Location (provider location):  [facility] [place]     Platform used for Video Visit: Halle Schaeffer NREMT    Patient underwent Laparoscopic duodenojejunostomy by Dr Zuniga for annular pancreas that was causing gastric outlet obstruction on 6/3/22.  He was discharged 6/6/22    Clyde Figueroa overall has the following complaints: No complaints, wants to advance diet. Tolerating full liquid diet. Had mashed potatoes yesterday.    On exam:  Wt 68.9 kg (152 lb)   BMI 20.05 kg/m    Lung exam: breathing unlabored      Saw his PCP at [facility] today.  Per patient incisions sites healing well and vitals normal.      Plan:  Will discuss timing of diet progression with Dr Zuniga.  Follow up PRN    Alyssia Woody PA-C, MPAS  Lead PA Surgical and Medical Weight Management     [phone]  [email or web address]

## 2022-06-15 ENCOUNTER — TRANSFERRED RECORDS (OUTPATIENT)
Dept: HEALTH INFORMATION MANAGEMENT | Facility: CLINIC | Age: 72
End: 2022-06-15

## 2022-06-15 ENCOUNTER — VIRTUAL VISIT (OUTPATIENT)
Dept: ENDOCRINOLOGY | Facility: CLINIC | Age: 72
End: 2022-06-15
Payer: COMMERCIAL

## 2022-06-15 VITALS — WEIGHT: 152 LBS | BODY MASS INDEX: 20.05 KG/M2

## 2022-06-15 DIAGNOSIS — Q45.1 ANNULAR PANCREAS: Primary | ICD-10-CM

## 2022-06-15 PROCEDURE — 99024 POSTOP FOLLOW-UP VISIT: CPT | Mod: 95 | Performed by: PHYSICIAN ASSISTANT

## 2022-06-15 ASSESSMENT — PAIN SCALES - GENERAL: PAINLEVEL: NO PAIN (0)

## 2022-06-15 NOTE — LETTER
6/15/2022       RE: Clyde Figueroa  [address]  [place] [address]     Dear Colleague,    Thank you for referring your patient, Clyde Figueroa, to the [facility] [place] at [facility]. Please see a copy of my visit note below.    Virtual Visit Check-In    During this virtual visit the patient is located in [place], patient verifies this as the location during the entirety of this visit.     Clyde is a 72 year old who is being evaluated via a billable video visit.      How would you like to obtain your AVS? MyChart  If the video visit is dropped, the invitation should be resent by: Send to e-mail at: [email or web address]  Will anyone else be joining your video visit? No    Video Start Time: 1:47 PM    Video-Visit Details    Type of service:  Video Visit    Video End Time:155    Originating Location (pt. 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  He was discharged 6/6/22    Clyde Figueroa overall has the following complaints: No complaints, wants to advance diet. Tolerating full liquid diet. Had mashed potatoes yesterday.    On exam:  Wt 68.9 kg (152 lb)   BMI 20.05 kg/m    Lung exam: breathing unlabored      Saw his PCP at [facility] today.  Per patient incisions sites healing well and vitals normal.      Plan:  Will discuss timing of diet progression with Dr Zuniga.  Follow up PRN    Alyssia Woody PA-C MPAS  Lead PA Surgical and Medical Weight Management     [phone]  [email or web address]

## 2022-06-15 NOTE — NURSING NOTE
Chief Complaint   Patient presents with     Surgical Followup       Vitals:    06/15/22 1331   Weight: 152 lb       Body mass index is 20.05 kg/m .      Jaylyn Schaeffer, EMT  Surgery Clinic

## 2022-06-16 ENCOUNTER — PATIENT OUTREACH (OUTPATIENT)
Dept: ENDOCRINOLOGY | Facility: CLINIC | Age: 72
End: 2022-06-16

## 2022-06-16 NOTE — PROGRESS NOTES
Clyde Figueroa was contacted several days post procedure via telephone for a status update and elected to have a telephone follow -up call approximately 7-10 days after original contact in lieu of a clinic visit with Dr. Shawn Zuniga.  He continues to do well and the steri-strips  have not peeled off.  The patients wounds are healed and the area is C/D/I.  He is afebrile, pain free, and francesca po; the patient is slowly resuming his normal activity.   Discussed restrictions including no lifting in excess of 15 pounds for 4 weeks.    Pathology was reviewed with the patient: N/A    All of Clyde Figueroa question's were answered and  he would like to return to the clinic on a PRN basis.  The patient is aware that  he may schedule a post op appointment at any time.    A copy of this note was routed to the patients surgeon.      Discussed diet advancement with patient.  Patient okay to advance as tolerated.

## 2022-06-27 NOTE — OP NOTE
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      General    Estimated blood loss: Minimal   Drains: None   Specimens: * No specimens in log *   Findings: Medium-sized hiatal hernia; duodenum without external scarring.   Complications: None.   Implants: None.           COMORBIDITIES:   Past Medical History:   Diagnosis Date     Diabetes mellitus (H)      Gastro-oesophageal reflux disease      History of blood transfusion      Hypertension      Pancreatic disease        INDICATIONS FOR PROCEDURE  Clyde Figueroa is a 72 year old male who has had quite severe gastrointestinal disease.    Of note, there are very clearly established diagnoses of hiatal hernia with reflux and associated Schatzki's Ring which have been dilated and observed numerous times.  Hiatal hernia is noted along with annular pancreas and associated duodenal stenosis.    I spoke with him about repair of these, which have also been recommended by Dr. Dye.      I reviewed the risks of surgery with Clyde Figueroa.    These include, but are not limited to, death, myocardial infarction, pneumonia, urinary tract infection, deep venous thrombosis with or without pulmonary embolus, abdominal infection from bowel injury or abscess, bowel obstruction, wound infection, and bleeding.        OPERATIVE PROCEDURE:     Clyde Figueroa was brought to the operating room and prepared in routine fashion. Under the benefits of general anesthesia, a left upper quadrant Veress needle was inserted and pneumoperitoneum  "was established using carbon dioxide gas to a maximum pressure of 15 mmHg. A total of five ports were placed into the abdomen, three 5-mm ports and two 12-mm ports..     A liver retractor was placed through the rightmost port and this provided a view of the upper stomach.    The operation was started by noting a medium-sized hiatal hernia; I dissected the right and left kenneth muscles and performed a circumferential hernia sac dissection to reduce the hernia sac and all of the stomach back into the abdomen.    Then I performed a crural approximation using 2-0 surgi-dac in figure of eight fashion.    I then turned my attention to performing a duodenojejunostomy.  I started this by examining the area of the duodenal bulb and gallbladder; there were a few adhesions in the area; no major scarring to suggest prior ulcer disease.    This area was satisfactory for potential duodenojejunostomy.    I then ran the small bowel from ligament of Treitz 20-30 cm and looped the bowel with 1/4\" penrose drain at 30cm from ligament.  I brought this loop into the right upper quadrant in the antecolic pathway.  I then sutured the backrow of 1st portion of duodenum to the jejunum in running fashion, orienting the small bowel with proximal jejunum on proximal duodenum.    I removed the penrose drain.  Next, I made enterotomies on duodenum and jejunum.  I used 3-0 polysorb to fashion the inner layer of duodenojejunostomy in running fashion posteriorly using baseball stitch and then transitioning to Sylvia anteriorly.  I completed the anastomosis from the right side using a 2nd 3-0 polysorb in Sylvia fashion and tied in the middle.    I used a second 2-0 surgidac anteriorly to secure a 2nd layer anastomosis and this completed the anastomosis.    Next, I used a 2-0 polysorb U-stitch on the mid-body of stomach to create a gastropexy and this provided some traction of stomach to keep it in the abdomen and this was sutured until low abdominal " pressure.    Finally, a transabdominal properitoneal anesthetic block was performed using 30 ml 0.5% bupivicaine diluted with 90 ml saline (120 mL total); this was injected using 22gauge spinal needle into the properitoneal planes around the ports and laterally along the anterior axillary line under direct optical guidance. Some of the mixture was used to inject local anesthetic at the skin of each incision as well.    Hemostasis was secured, and the liver retractor and all ports were removed from the abdomen under direct visualization.     All needle and sponge counts were correct x2 at the end of the operation, and I was present for all critical components of the procedure.     Skin incisions were closed using skin staples, and sterile dressings were placed.     Davion Zuniga MD  Surgery  [phone] (hospital )  [phone] (clinic nurses)

## 2022-08-04 ENCOUNTER — TELEPHONE (OUTPATIENT)
Dept: GASTROENTEROLOGY | Facility: CLINIC | Age: 72
End: 2022-08-04

## 2022-08-04 DIAGNOSIS — R19.7 DIARRHEA: Primary | ICD-10-CM

## 2022-08-04 NOTE — TELEPHONE ENCOUNTER
"Pre assessment call placed to patient for upcoming colonoscopy on 8.10.22.    Patient is taking Xarelto. Holding interval of 2 days. Patient states he had another stroke \"a couple of months ago\". States he could not remember exactly when.     Discussed talking with PCP and neurology (these providers are in [facility]) regarding management of the Xarelto prior to procedure.     Patient verbalized understanding and states will call them tomorrow then call endoscopy back.   "

## 2022-08-05 RX ORDER — BISACODYL 5 MG/1
TABLET, DELAYED RELEASE ORAL
Qty: 4 TABLET | Refills: 0 | Status: SHIPPED | OUTPATIENT
Start: 2022-08-05

## 2022-08-05 NOTE — TELEPHONE ENCOUNTER
Pre assessment questions completed for upcoming colonoscopy procedure scheduled on 8.10.22    Discussed at home rapid antigen COVID test 1-2 days prior to procedure.    Reviewed procedural arrival time 0730 and facility location [facility].    Designated  policy reviewed. Instructed to have someone stay 6 hours post procedure.     Anticoagulation/blood thinners? Xarelto - neurology provider says to hold for 2 to 3 days prior to procedure per patient.    Electronic implanted devices? No    Reviewed standard golytely prep instructions with patient. No fiber/iron supplements or foods that contain nuts/seeds prior to procedure.     Patient verbalized understanding and had no questions or concerns at this time.    Marielos Rodgers RN

## 2022-08-05 NOTE — TELEPHONE ENCOUNTER
Pre assessment call placed to patient for upcoming colonoscopy on 8.10.22.     Patient states talked to his neurology clinic in [place] and was told he is ok to have procedure next week and can stop the Xarelto 2 to 3 days prior to procedure.    Patient requests a call back later today to go over colonoscopy prep instructions.

## 2022-08-10 ENCOUNTER — HOSPITAL ENCOUNTER (OUTPATIENT)
Facility: CLINIC | Age: 72
Discharge: HOME | End: 2022-08-10
Attending: INTERNAL MEDICINE | Admitting: INTERNAL MEDICINE
Payer: COMMERCIAL

## 2022-08-10 VITALS
HEART RATE: 80 BPM | RESPIRATION RATE: 18 BRPM | DIASTOLIC BLOOD PRESSURE: 86 MMHG | TEMPERATURE: 97.8 F | OXYGEN SATURATION: 98 % | SYSTOLIC BLOOD PRESSURE: 155 MMHG

## 2022-08-10 LAB — COLONOSCOPY: NORMAL

## 2022-08-10 PROCEDURE — G0500 MOD SEDAT ENDO SERVICE >5YRS: HCPCS | Performed by: INTERNAL MEDICINE

## 2022-08-10 PROCEDURE — 250N000011 HC RX IP 250 OP 636: Performed by: INTERNAL MEDICINE

## 2022-08-10 PROCEDURE — 45380 COLONOSCOPY AND BIOPSY: CPT | Performed by: INTERNAL MEDICINE

## 2022-08-10 PROCEDURE — 99153 MOD SED SAME PHYS/QHP EA: CPT | Performed by: INTERNAL MEDICINE

## 2022-08-10 PROCEDURE — 88305 TISSUE EXAM BY PATHOLOGIST: CPT | Mod: TC | Performed by: INTERNAL MEDICINE

## 2022-08-10 RX ORDER — LIDOCAINE 40 MG/G
CREAM TOPICAL
Status: DISCONTINUED | OUTPATIENT
Start: 2022-08-10 | End: 2022-08-10 | Stop reason: HOSPADM

## 2022-08-10 RX ORDER — NALOXONE HYDROCHLORIDE 0.4 MG/ML
0.4 INJECTION, SOLUTION INTRAMUSCULAR; INTRAVENOUS; SUBCUTANEOUS
Status: DISCONTINUED | OUTPATIENT
Start: 2022-08-10 | End: 2022-08-10 | Stop reason: HOSPADM

## 2022-08-10 RX ORDER — NALOXONE HYDROCHLORIDE 0.4 MG/ML
0.2 INJECTION, SOLUTION INTRAMUSCULAR; INTRAVENOUS; SUBCUTANEOUS
Status: DISCONTINUED | OUTPATIENT
Start: 2022-08-10 | End: 2022-08-10 | Stop reason: HOSPADM

## 2022-08-10 RX ORDER — ONDANSETRON 4 MG/1
4 TABLET, ORALLY DISINTEGRATING ORAL EVERY 6 HOURS PRN
Status: DISCONTINUED | OUTPATIENT
Start: 2022-08-10 | End: 2022-08-10 | Stop reason: HOSPADM

## 2022-08-10 RX ORDER — ONDANSETRON 2 MG/ML
4 INJECTION INTRAMUSCULAR; INTRAVENOUS
Status: DISCONTINUED | OUTPATIENT
Start: 2022-08-10 | End: 2022-08-10 | Stop reason: HOSPADM

## 2022-08-10 RX ORDER — ONDANSETRON 2 MG/ML
4 INJECTION INTRAMUSCULAR; INTRAVENOUS EVERY 6 HOURS PRN
Status: DISCONTINUED | OUTPATIENT
Start: 2022-08-10 | End: 2022-08-10 | Stop reason: HOSPADM

## 2022-08-10 RX ORDER — FLUMAZENIL 0.1 MG/ML
0.2 INJECTION, SOLUTION INTRAVENOUS
Status: DISCONTINUED | OUTPATIENT
Start: 2022-08-10 | End: 2022-08-10 | Stop reason: HOSPADM

## 2022-08-10 RX ORDER — FENTANYL CITRATE 50 UG/ML
INJECTION, SOLUTION INTRAMUSCULAR; INTRAVENOUS PRN
Status: COMPLETED | OUTPATIENT
Start: 2022-08-10 | End: 2022-08-10

## 2022-08-10 RX ORDER — PROCHLORPERAZINE MALEATE 5 MG
5 TABLET ORAL EVERY 6 HOURS PRN
Status: DISCONTINUED | OUTPATIENT
Start: 2022-08-10 | End: 2022-08-10 | Stop reason: HOSPADM

## 2022-08-10 RX ADMIN — FENTANYL CITRATE 100 MCG: 50 INJECTION, SOLUTION INTRAMUSCULAR; INTRAVENOUS at 08:35

## 2022-08-10 RX ADMIN — MIDAZOLAM 1 MG: 1 INJECTION INTRAMUSCULAR; INTRAVENOUS at 08:41

## 2022-08-10 RX ADMIN — MIDAZOLAM 1 MG: 1 INJECTION INTRAMUSCULAR; INTRAVENOUS at 08:35

## 2022-08-10 ASSESSMENT — ACTIVITIES OF DAILY LIVING (ADL): ADLS_ACUITY_SCORE: 37

## 2022-08-10 NOTE — H&P
Gastroenterology Pre-op History and Physical    Clyde Figueroa MRN# [id number]   Age: 72 year old YOB: 1950      Date of Surgery: 08/10/22  [facility]      Date of Exam 8/10/2022 Facility Same Day       Primary care provider: Luis Turpin         Chief Complaint and/or Reason for Procedure:   71 yo male with chronic diarrhea.   Recent duodenoenterostomy for management of annular pancreas with GOO.         Past Medical and Surgical History:     Past Medical History:   Diagnosis Date     Diabetes mellitus (H)      Gastro-oesophageal reflux disease      History of blood transfusion      Hypertension      Pancreatic disease      Past Surgical History:   Procedure Laterality Date     APPENDECTOMY       ARTHROSCOPY SHOULDER ROTATOR CUFF REPAIR       ENDOSCOPIC RETROGRADE CHOLANGIOPANCREATOGRAM  5/14/2013    Procedure: ENDOSCOPIC RETROGRADE CHOLANGIOPANCREATOGRAM;  Endoscopic retrograde cholangiopancreatography. Balloon Dilation of Duodenal Stricture with  Endogastric Duodenoscopy;  Surgeon: Jhony Khan MD;  [facility]: [facility]     ENDOSCOPIC ULTRASOUND UPPER GASTROINTESTINAL TRACT (GI) N/A 7/30/2015    Procedure: ENDOSCOPIC ULTRASOUND, ESOPHAGOSCOPY / UPPER GASTROINTESTINAL TRACT (GI);  Surgeon: Darren Dye MD;  [facility] OR     ESOPHAGOSCOPY, GASTROSCOPY, DUODENOSCOPY (EGD), COMBINED  4/10/2013    Procedure: COMBINED ESOPHAGOSCOPY, GASTROSCOPY, DUODENOSCOPY (EGD), BIOPSY SINGLE OR MULTIPLE;;  Surgeon: Bright Carl MD;  Location:  GI     ESOPHAGOSCOPY, GASTROSCOPY, DUODENOSCOPY (EGD), COMBINED  4/30/2013    Procedure: COMBINED ESOPHAGOSCOPY, GASTROSCOPY, DUODENOSCOPY (EGD);;  Surgeon: Darren Mejía MD;  [facility]     ESOPHAGOSCOPY, GASTROSCOPY, DUODENOSCOPY (EGD), COMBINED  5/28/2013    Procedure: COMBINED ESOPHAGOSCOPY, GASTROSCOPY, DUODENOSCOPY (EGD);  Upper Endoscopy with Balloon Dilation *Latex Safe*;  Surgeon:  Jhony Khan MD;  [facility]     ESOPHAGOSCOPY, GASTROSCOPY, DUODENOSCOPY (EGD), COMBINED  6/18/2013    Procedure: COMBINED ESOPHAGOSCOPY, GASTROSCOPY, DUODENOSCOPY (EGD);  Upper Endoscopy with esophageal dilation;  Surgeon: Jhony Khan MD;  [facility]     ESOPHAGOSCOPY, GASTROSCOPY, DUODENOSCOPY (EGD), COMBINED N/A 4/4/2015    Procedure: COMBINED ESOPHAGOSCOPY, GASTROSCOPY, DUODENOSCOPY (EGD);  Surgeon: Rodney Montesinos MD;  [facility]     ESOPHAGOSCOPY, GASTROSCOPY, DUODENOSCOPY (EGD), COMBINED Left 7/15/2015    Procedure: COMBINED ESOPHAGOSCOPY, GASTROSCOPY, DUODENOSCOPY (EGD), BIOPSY SINGLE OR MULTIPLE;  Surgeon: Rodney Montesinos MD;  [facility]: [facility]     ESOPHAGOSCOPY, GASTROSCOPY, DUODENOSCOPY (EGD), COMBINED N/A 8/8/2018    Procedure: COMBINED ESOPHAGOSCOPY, GASTROSCOPY, DUODENOSCOPY (EGD), BIOPSY SINGLE OR MULTIPLE;  EGD;  Surgeon: Darren Dye MD;  [facility]     ESOPHAGOSCOPY, GASTROSCOPY, DUODENOSCOPY (EGD), COMBINED N/A 6/30/2021    Procedure: ESOPHAGOGASTRODUODENOSCOPY, WITH BIOPSY;  Surgeon: Darren Dye MD;  Location: [facility] GI     HERNIA REPAIR       LAPAROSCOPIC DUODENOJEJUNOSTOMY N/A 6/3/2022    Procedure: DUODENOJEJUNOSTOMY LAPAROSCOPIC;;  Surgeon: Davion Zuniga MD;  [facility]     LAPAROSCOPIC HERNIORRHAPHY HIATAL N/A 6/3/2022    Procedure: HERNIORRHAPHY, HIATAL, LAPAROSCOPIC, GASTROPEXY;  Surgeon: Davion Zuniga MD;  Location: [facility]            Medications (include herbals and vitamins):        Medications Prior to Admission   Medication Sig Dispense Refill Last Dose     bisacodyl (DULCOLAX) 5 MG EC tablet Take as directed. One day before exam take 2 tablets at 3 PM. Take 2 tablets at 11 PM. 4 tablet 0 8/9/2022 at Unknown time     dulaglutide (TRULICITY) 1.5 MG/0.5ML pen INJECT 0.5ML SUBCUTANEOUSLY ONCE WEEKLY   Past Week at Unknown time     dutasteride (AVODART) 0.5 MG capsule Take 0.5 mg by mouth every  48 hours    Past Week at Unknown time     empagliflozin (JARDIANCE) 25 MG TABS tablet Take 25 mg by mouth   Past Week at Unknown time     fish oil-omega-3 fatty acids 1000 MG capsule Take 1 capsule by mouth   Past Week at Unknown time     LISINOPRIL PO Take by mouth daily    Past Week at Unknown time     metFORMIN (GLUCOPHAGE) 500 MG/5ML SOLN Take by mouth 2 times daily (with meals)   Past Week at Unknown time     metoprolol tartrate (LOPRESSOR) 25 MG tablet Take 2 tablets (50 mg) by mouth 2 times daily 60 tablet 0 Past Week at Unknown time     oxyCODONE (ROXICODONE) 5 MG tablet Take 1 tablet (5 mg) by mouth every 6 hours as needed for breakthrough pain 5 tablet 0 8/9/2022 at 0700     pantoprazole (PROTONIX) 40 MG enteric coated tablet Take 1 tablet (40 mg) by mouth daily 90 tablet 0 Past Week at Unknown time     polyethylene glycol (GOLYTELY) 236 g suspension Take as directed. One day before exam fill the jug with water. Cover and shake until well mixed. At 6 PM start drinking an 8oz glass of mixture every 15 minutes until jug is 1/2 empty. Store remainder in the refrigerator.  At 11 PM Start drinking the other half of the Golytely jug. Drink one 8-ounce glass every 15 minutes until the jug is empty. 4000 mL 0 8/9/2022 at Unknown time     pregabalin (LYRICA) 50 MG capsule Take 50 mg by mouth 3 times daily   Past Week at Unknown time     ROPINIROLE HCL PO Take 0.25 mg by mouth At Bedtime   Past Week at Unknown time     simvastatin (ZOCOR) 40 MG tablet Take 1 tablet (40 mg) by mouth every evening 30 tablet  Past Week at Unknown time     tamsulosin (FLOMAX) 0.4 MG capsule TAKE 1 CAPSULE (0.4 MG) BY MOUTH EVERY MORNING.   Past Week at Unknown time     VITAMIN D, CHOLECALCIFEROL, PO Take 2,000 Units by mouth daily    Past Week at Unknown time     XARELTO ANTICOAGULANT 20 MG TABS tablet Take 20 mg by mouth daily   Past Week at Unknown time     blood glucose (NO BRAND SPECIFIED) test strip 1 Strip by Misc.(Non-Drug; Combo  Route) route.        CONTOUR NEXT TEST test strip USE TO TEST ONCE DAILY        Microlet Lancets MISC USE TO TEST ONCE DAILY                Allergies:    No Known Allergies            Physical Exam:   All vitals have been reviewed  No data found.  No intake/output data recorded.  Airway assessment:   Mallampatti classification: Class I (visualization of the soft palate, fauces, uvula, anterior and posterior pillars)}      Lungs:   No increased work of breathing, good air exchange, clear to auscultation bilaterally, no crackles or wheezing     Cardiovascular:   regular rate and rhythm and normal S1 and S2                 Anesthetic risk and/or ASA classification:   ASA 2    Darren Dye MD

## 2022-08-10 NOTE — LETTER
"August 11, 2022      Clyde Figueroa  [id number] [facility] [address]        Dear ,    It was a pleasure to meet you at the time of your recent colonoscopy examination. As you may recall, biopsies were obtained from your normal-appearing colon lining to check for potential causes of diarrhea. These biopsies have returned normal, without evidence for inflammation or any other cause of diarrhea.    If you continue to have problems with diarrhea, please call my office to discuss further follow-up and evaluation.    Please feel free to call if you have any questions about this letter. I can be reached at [phone].    TRINITY Dye MD  Professor of Medicine  Division of Gastroenterology, Hepatology and Nutrition  [facility]        Resulted Orders   Surgical Pathology Exam   Result Value Ref Range    Case Report       Surgical Pathology Report                         Case: [id number]                                  Authorizing Provider:  Darren Dye MD   Collected:           08/10/2022 08:48 AM          Ordering Location:     [facility]          Received:            08/10/2022 09:05 AM                                 Endoscopy                                                                    Pathologist:           Trey Chowdary MD                                                                 Specimen:    Other, Random Colon                                                                        Final Diagnosis       A. RANDOM COLON BIOPSIES:  - Colonic mucosa with no significant histologic abnormality  - Negative for active inflammation and lymphocytic colitis      Clinical Information       Diarrhea      Gross Description       A(1). Other, Random Colon :  The specimen is received in formalin with proper patient identification, labeled \"random colon\".  The specimen " consists of multiple irregular tan pieces of soft tissue ranging from 0.2-0.4 cm in greatest dimension which are entirely submitted in cassette A1.        Microscopic Description       Microscopic examination was performed.        Performing Labs       The technical component of this testing was completed at [facility]      Case Images         If you have any questions or concerns, please call the clinic at the number listed above.       Sincerely,      Darren Dye MD

## 2022-08-11 LAB
PATH REPORT.COMMENTS IMP SPEC: NORMAL
PATH REPORT.COMMENTS IMP SPEC: NORMAL
PATH REPORT.FINAL DX SPEC: NORMAL
PATH REPORT.GROSS SPEC: NORMAL
PATH REPORT.MICROSCOPIC SPEC OTHER STN: NORMAL
PATH REPORT.RELEVANT HX SPEC: NORMAL
PHOTO IMAGE: NORMAL

## 2022-08-11 PROCEDURE — 88305 TISSUE EXAM BY PATHOLOGIST: CPT | Mod: 26 | Performed by: PATHOLOGY

## 2022-08-31 ENCOUNTER — TELEPHONE (OUTPATIENT)
Dept: ONCOLOGY | Facility: CLINIC | Age: 72
End: 2022-08-31

## 2022-08-31 DIAGNOSIS — R19.7 DIARRHEA: Primary | ICD-10-CM

## 2022-08-31 DIAGNOSIS — A04.9 BACTERIAL INTESTINAL INFECTION, UNSPECIFIED: ICD-10-CM

## 2022-08-31 NOTE — TELEPHONE ENCOUNTER
"Pt calls noting that he had a colonoscopy on 8/10. Began to experience fecal incontinence between the hours of 10 pm to 10 AM, is not able to count # of episodes as \"it just runs\". Pt was previously taking 1 imodium in the AM, 1 in the PM, today began recommended dosing of 2 in AM and 1 after each loose movement. Not an oncology Pt, I believe he was mistakenly routed. Walked over and spoke with GI clinic RN who will speak with team and Pt.  "

## 2022-08-31 NOTE — TELEPHONE ENCOUNTER
Returned call to patient. Previously seen by Dr Dye for diarrhea, colonoscopy in August w/ no abnormal findings.     Per patient, diarrhea started a few days ago. Stooling mostly at night, getting up most of the night last night from midnight on. Having fecal incontinence.   Hasn't changed anything with diet, no other nausea/vomiting/fever. Pt is still taking immodium, BID am and PM. Pt has doubled dose today, 2 pills am, will take 2 pm.    Message sent to Dr Dye to determine additional follow up.    ML

## 2022-09-01 ENCOUNTER — DOCUMENTATION ONLY (OUTPATIENT)
Dept: GASTROENTEROLOGY | Facility: CLINIC | Age: 72
End: 2022-09-01

## 2022-09-01 NOTE — TELEPHONE ENCOUNTER
Per Dr Dye  I think we should repeat some stool studies (C diff, infection panel via LYNDSAY), given this resolved and then recurred. Also elastase.   Then if all normal may need breath test for SIBO.     For now:   1) Stool for C dif, LYNDSAY and elastase.   2) Get scheduled for clinic visit with me next available non-overbook.   3) If stool testing is normal then I'll order the breath test.   4) If all of this normal, then I'll do an EGD with SB biopsy while awaiting the clinic visit.     I'll see him short term but then will need to get him hooked up for luminal consult.     Continue imodium for now.       Pt reporting improved diarrhea w/ 2 lomotil last night. Encouraged patient to continue to take imodium. Will fax stool testing to PCP, message sent to scheduling for clinic with Hardik. Asked pt to call us back once stools testing submitted so we an watch for result.    Pina Joya, RN Care Coordinator

## 2022-09-01 NOTE — PROGRESS NOTES
Faxed orders for stool test to PCP Dr. Luis Turpin fax number [phone]   Order dated 9/1/2022  Stool LYNDSAY   Fecal elastase   Cdiff     Frida De La Torre CMA Lead

## 2022-09-08 ENCOUNTER — PATIENT OUTREACH (OUTPATIENT)
Dept: GASTROENTEROLOGY | Facility: CLINIC | Age: 72
End: 2022-09-08

## 2022-09-08 DIAGNOSIS — A04.72 C. DIFFICILE COLITIS: Primary | ICD-10-CM

## 2022-09-08 RX ORDER — VANCOMYCIN HYDROCHLORIDE 125 MG/1
125 CAPSULE ORAL 4 TIMES DAILY
Qty: 40 CAPSULE | Refills: 0 | Status: SHIPPED | OUTPATIENT
Start: 2022-09-08 | End: 2022-09-18

## 2022-09-08 NOTE — PROGRESS NOTES
Called pt to discuss positive cdiff result and Dr Dye's recommendations. Left VM. Melba prescribed and sent to his pharmacy on file.    He should have script for oral vancomycin 125 mg po qid x 10 days.     He should changes his sheets and wash them with bleach. An vacuum his upholstered furniture (chairs, couches).     He should call if diarrhea not resolved, in which case we should get him in to see luminal.    1530  Pt called back, discussed finding of positive cdiff and treatment. Pt verbalized understanding of antibiotic treatment and will  from his pharmacy tomorrow if available. Reviewed hand washing, bleaching, washing of sheets/clothes/towels. Education sent via email [email or web address]. Pt questions answered. Will follow up if diarrhea/symptoms not resolved with this treatment.     Jennifer Hendricks, RN, BSN,   [facility]  Care coordinator

## 2022-09-19 ENCOUNTER — PATIENT OUTREACH (OUTPATIENT)
Dept: ONCOLOGY | Facility: CLINIC | Age: 72
End: 2022-09-19

## 2022-09-19 NOTE — PROGRESS NOTES
Pt called with update that his stools are normal, no longer having diarrhea after the cdiff treatment. Asked if any follow up is needed, according to Dr Dye no clinic visit needed. Pt asked if surgery follow up is needed, according to note from GI surgery nurse on 6/16, prn follow up. Pt in agreement. Will cancel clinic visit with Dr Dye on 11/21/22.    Jennifer Hendricks, RN, BSN,   [facility]  Care coordinator

## 2022-09-26 ENCOUNTER — PATIENT OUTREACH (OUTPATIENT)
Dept: GASTROENTEROLOGY | Facility: CLINIC | Age: 72
End: 2022-09-26

## 2022-09-26 DIAGNOSIS — A04.72 C. DIFFICILE COLITIS: ICD-10-CM

## 2022-09-26 DIAGNOSIS — R19.7 DIARRHEA OF PRESUMED INFECTIOUS ORIGIN: Primary | ICD-10-CM

## 2022-09-26 NOTE — PROGRESS NOTES
Pt called last week stating the treatment seemed to work. But now called this morning stating that the diarrhea is back, taking immodium X 4/day and staying hydrated.     I placed an outside luminal GI referral, pt would prefer to follow up locally in [facility]. Message routed to Dr Dye as update.    1340  Called pt back to relay Dr Dye's recommendations    1) He should NOT be taking immodium in the setting of C diff.   2) I would recommend treatment with fidaxomicin if his insurance will cover it. 20 tablets -   Take 1 bid x 5 days, then 1 every other day until finished.      Advised pt to see how much prescription will cost and to see if discounts are available on goodrx or nicerx. Pt understands he should stop taking immodium at this time.    Jennifer Hendricks, RN, BSN,   [facility]  Care coordinator

## 2022-09-27 RX ORDER — VANCOMYCIN HYDROCHLORIDE 125 MG/1
CAPSULE ORAL
Qty: 84 CAPSULE | Refills: 0 | Status: SHIPPED | OUTPATIENT
Start: 2022-09-27 | End: 2022-11-08

## 2022-09-27 NOTE — PROGRESS NOTES
Called pt to follow up on VM left regarding cost of fidaxomicin. Prescribed vancomycin per Dr Dye's orders  84 capsules of 125 mg vancomycin.   1 po qid x 2 weeks   1 po bid x 7 d   1 po every day x 7 d   1 po every other day x 14 d.    Left  with update on prescription sent to his [facility] pharmacy. Will f/up on GI referral to [facility]    0790  Pt returned call, verbalized understanding of medication. [facility] in [facility] clinic

## 2022-09-30 NOTE — PROGRESS NOTES
Called pt to follow up on whether he was able to obtain new vanco prescription. Pt has not yet heard from his pharmacy in regards to this, I asked him to please call his pharmacy to inquire on when it will be available and to start taking it as soon as he can.  Also has not heard from [facility] about GI appt, refaxed referral order to [phone] with request for urgent appt.

## 2022-10-05 DIAGNOSIS — A04.72 C. DIFFICILE COLITIS: Primary | ICD-10-CM

## 2022-10-13 ENCOUNTER — PATIENT OUTREACH (OUTPATIENT)
Dept: GASTROENTEROLOGY | Facility: CLINIC | Age: 72
End: 2022-10-13

## 2022-10-13 ENCOUNTER — TELEPHONE (OUTPATIENT)
Dept: GASTROENTEROLOGY | Facility: CLINIC | Age: 72
End: 2022-10-13

## 2022-10-13 NOTE — TELEPHONE ENCOUNTER
[facility] Call Center    Phone Message    May a detailed message be left on voicemail: yes     Reason for Call: Appointment Intake    Referring Provider Name: Kamaljit Dye MD  Diagnosis and/or Symptoms: C. difficile colitis [A04.72]    Per triage notes, patient is to be seen as a Recurrent C. Diff Urgent/Emergent (2-4 weeks), but is currently scheduled on 12/21/2022 with Dr. Navarro. Current appointment is outside requested time frame. Please review for further follow up per scheduling guidelines. Thanks!     Action Taken: Message routed to:  [facility] GI    Travel Screening: Not Applicable

## 2022-10-13 NOTE — PROGRESS NOTES
Pt called, states that he has not yet gotten a luminal GI appt call, order was placed prior to today and communication relayed to the appropriate staff.   Repeat messages sent   Pt is unable to get these scheduled at [facility], msg received that they are not accepting new patients.    Jennifer Hendricks, RN, BSN,   [facility]  Care coordinator

## 2022-10-17 NOTE — TELEPHONE ENCOUNTER
REFERRAL INFORMATION:    Referring Provider:  Dr. Darren Dye     Referring Clinic:  [facility]     Reason for Visit/Diagnosis: C. Diff, Colitis      FUTURE VISIT INFORMATION:    Appointment Date: 11/8/2022    Appointment Time: 12 PM      NOTES STATUS DETAILS   OFFICE NOTE from Referring Provider Internal 5/16/2022 Office visit with Dr. Dye      OFFICE NOTE from Other Specialist Care Everywhere 4/27/2022, 4/21/2022, 4/19/2022, 6/14/19 Office visit with KARLA Story ([facility])     4/13/2022 Office visit with Dr. Ruddy Horvath ([facility])     6/20/19 Office visit with Paige Iniguez PA-C ([facility])     5/28/19 Office visit with Troy Hinton PA-C ([facility])      HOSPITAL DISCHARGE SUMMARY/  ED VISITS Care Everywhere 6/11/19 ([facility])   OPERATIVE REPORT N/A    MEDICATION LIST Internal         ENDOSCOPY  Care Everywhere EGD: 4/5/2022 ([facility])    COLONOSCOPY Care Everywhere 3/17/16 ([facility])    ERCP N/A    EUS N/A    STOOL TESTING Care Everywhere 4/23/2022, 4/15/2022   PERTINENT LABS Internal/ Care Everywhere    PATHOLOGY REPORTS (RELATED) Internal 8/10/2022   IMAGING (CT, MRI, EGD, MRCP, Small Bowel Follow Through/SBT, MR/CT Enterography) Care Everywhere [facility] ([facility])  - XR Abdomen: 3/29/2022  - CT Abdomen Pelvis: 3/29/2022, 6/11/19     10/17/2022 3:02pm [facility] ([phone][facility] for images. -Bhao    10/21/2022 Images are in PACS. -Bhao

## 2022-10-24 ENCOUNTER — PATIENT OUTREACH (OUTPATIENT)
Dept: GASTROENTEROLOGY | Facility: CLINIC | Age: 72
End: 2022-10-24

## 2022-10-24 NOTE — PROGRESS NOTES
Pt called to update that he has more diarrhea since decreasing his vanco dose to 2 tablets. Said last week he was feeling so weak he could barely get out of his chair, but he is feeling better now. We discussed that if he is getting dehydrated and symptomatic of that he needs to go to the ED.   Pt has an appt with Tomeka Plummer on 11/8, pt verbalized understanding of this appointment.    Jennifer Hendricks, RN, BSN,   [facility]  Care coordinator

## 2022-10-27 ENCOUNTER — PATIENT OUTREACH (OUTPATIENT)
Dept: GASTROENTEROLOGY | Facility: CLINIC | Age: 72
End: 2022-10-27

## 2022-10-27 NOTE — PROGRESS NOTES
Pt called update that his internal medicine provider Dr Spangler did have him repeat the cdiff test and it was negative, in [place] from 10/26/22: Negative for toxigenic Clostridioides difficile. Inquiring if he can start taking immodium    Per Dr DyeOK to try imodium at this point while awaiting the luminal visit. Can get OTC. One tablet (2 mg) po bid prn. Stop if pain, fevers or bloody stools.     Pt verbalized understanding. Did discuss where he was during the vancomycin taper and that it would be advised to continue the abx to the end. But will confirm with Dr Dye.   Pt will continue to plan on visit with Tomeka Plummer on 11/8/22    Jennifer Hendricks, RN, BSN,   [facility]  Care coordinator

## 2022-10-31 ENCOUNTER — TELEPHONE (OUTPATIENT)
Dept: GASTROENTEROLOGY | Facility: CLINIC | Age: 72
End: 2022-10-31

## 2022-10-31 NOTE — TELEPHONE ENCOUNTER
Called to remind patient of their upcoming appointment with our GI clinic, on Tuesday 11/8/2022 at 12:00PM with Tomeka Plummer. This appointment is scheduled as a video visit. You will receive a call approximately 30 minutes prior to check you in, you must be in [place] for this visit., if your appointment is virtual (video or telephone) you need to be in [place] for the visit. To reschedule or cancel patient to call [phone].    Irma Briceno MA

## 2022-11-01 ENCOUNTER — TELEPHONE (OUTPATIENT)
Dept: GASTROENTEROLOGY | Facility: CLINIC | Age: 72
End: 2022-11-01

## 2022-11-01 NOTE — TELEPHONE ENCOUNTER
Called to remind patient of their upcoming appointment with our GI clinic, on Tuesday 11/8/2022 at 12:00PM with Tomeka Plummer. This appointment is scheduled as a video visit. You will receive a call approximately 30 minutes prior to check you in, you must be in [place] for this visit., if your appointment is virtual (video or telephone) you need to be in [place] for the visit. To reschedule or cancel patient to call [phone].     Irma Briceno MA

## 2022-11-08 ENCOUNTER — VIRTUAL VISIT (OUTPATIENT)
Dept: GASTROENTEROLOGY | Facility: CLINIC | Age: 72
End: 2022-11-08
Attending: INTERNAL MEDICINE
Payer: COMMERCIAL

## 2022-11-08 ENCOUNTER — PRE VISIT (OUTPATIENT)
Dept: GASTROENTEROLOGY | Facility: CLINIC | Age: 72
End: 2022-11-08

## 2022-11-08 VITALS — BODY MASS INDEX: 20.94 KG/M2 | WEIGHT: 158 LBS | HEIGHT: 73 IN

## 2022-11-08 DIAGNOSIS — A04.72 C. DIFFICILE COLITIS: ICD-10-CM

## 2022-11-08 PROCEDURE — 99215 OFFICE O/P EST HI 40 MIN: CPT | Mod: 95 | Performed by: PHYSICIAN ASSISTANT

## 2022-11-08 ASSESSMENT — PAIN SCALES - GENERAL: PAINLEVEL: NO PAIN (0)

## 2022-11-08 NOTE — PATIENT INSTRUCTIONS
It was a pleasure taking care of you today.  I've included a brief summary of our discussion and care plan from today's visit below.  Please review this information with your primary care provider.  _______________________________________________________________________    My recommendations are summarized as follows:    1- Finish oral vancomycin as prescribed  2. If any recurrent diarrhea (for example, 3 or more loose stools daily for at least 3 days in a row) please call our clinic or go to PCP to set up a stool sample.  3. If the stool test is positive again for C diff, please let us know so that we can discuss setting of a stool transplant if you are interested.  4. -- Recommend starting supplementation with a powdered soluble fiber. When used on a daily basis, this can help regulate the consistency of your stools. Metamucil (psyllium) and Citrucel are preferred examples. You can start with 1-2 teaspoons per day, with goal to gradually increase to 1 tablespoon daily. You can increase up to 1 tablespoon three times daily if needed. It is important to stay well-hydrated with use of fiber supplementation and to make sure that the fiber powder is well mixed with water as directed on the label. You may experience some bloating with initiation of fiber, which will improve over the first few weeks. A good trial to evaluate the effect is 3-6 months. Of note, many of the fiber products contain artificial sweeteners, which can cause bloating, gas, and diarrhea in those who may be sensitive to artificial sweeteners. If this is the case, recommend trying Metamucil Premium Blend (with Stevia), Metamucil 4-in-1 without Added Sweeteners, or Bellway (sweetened with Monk fruit extract).    Return to GI Clinic prn   ______________________________________________________________________    How do I schedule labs, imaging studies, or procedures that were ordered in clinic today?     Labs: To schedule lab appointment at the [facility], use my chart or call [phone]. If you have a [facility] lab closer to home where you are regularly seen you can give them a call.     Procedures: If a colonoscopy, upper endoscopy, breath test, esophageal manometry, or pH impedence was ordered today, our endoscopy team will call you to schedule this. If you have not heard from our endoscopy team within a week, please call [phone] to schedule.     Imaging Studies: If you were scheduled for a CT scan, X-ray, MRI, ultrasound, HIDA scan or other imaging study, please call [phone] to have this scheduled.     Referral: If a referral to another specialty was ordered, expect a phone call or follow instructions above. If you have not heard from anyone regarding your referral in a week, please call our clinic to check the status.     Who do I call with any questions after my visit?  Please be in touch if there are any further questions that arise following today's visit.  There are multiple ways to contact your gastroenterology care team.      During business hours, you may reach a Gastroenterology nurse at [phone]    To schedule or reschedule an appointment, please call [phone].     You can always send a secure message through [employer].  [facility] messages are answered by your nurse or doctor typically within 24 hours.  Please allow extra time on weekends and holidays.      For urgent/emergent questions after business hours, you may reach the on-call GI Fellow by contacting the [facility]  at [phone].     How will I get the results of any tests ordered?    You will receive all of your results.  If you have signed up for [facility], any tests ordered at your visit will be available to you after your physician reviews them.  Typically this takes 1-2 weeks.  If there are urgent results that require a change in your care plan, your physician or nurse will call you to discuss the next steps.      What is  ITS Compliance?  ITS Compliance is a secure way for you to access all of your healthcare records from the [facility].  It is a web based computer program, so you can sign on to it from any location.  It also allows you to send secure messages to your care team.  I recommend signing up for [employer] Compliance access if you have not already done so and are comfortable with using a computer.      How to I schedule a follow-up visit?  If you did not schedule a follow-up visit today, please call [phone] to schedule a follow-up office visit.      Sincerely,    Tomeka Plummer PA-C  Division of Gastroenterology, Hepatology & Nutrition  [facility]

## 2022-11-08 NOTE — PROGRESS NOTES
Clyde is a 72 year old who is being evaluated via a billable video visit.      How would you like to obtain your AVS? [place]  If the video visit is dropped, the invitation should be resent by: [phone]  Will anyone else be joining your video visit? No   No Bp taken      Video-Visit Details    Video Start Time: 1211    Type of service:  Video Visit    Video End Time:1248    Originating Location (pt. Location): Home        Distant Location (provider location):  Off-site    Platform used for Video Visit: Halle

## 2022-11-08 NOTE — LETTER
"    11/8/2022         RE: Clyde Figueroa  [address] [facility] [address]        Dear Colleague,    Thank you for referring your patient, Clyde Figueroa, to the [facility] [place]. Please see a copy of my visit note below.    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 He noted that he would have fecal incontinence at times, fecal urgency, and runny stools intermittently, with 2-3 bowel movements per day. Prior to this, he reports a typical pattern for him included \"normal\" hard stools, which could be BSC 1-3. In work-up of this diarrhea, he underwent multiple stool tests for enteric pathogens, which were negative, including negative stool testing for C diff PCR on at least two occasions. Fecal elastase normal. Colonoscopy was unrevealing, with negative random colon biopsies.    Due to ongoing diarrhea, C diff PCR was checked again 9/3/2022 and this time positive, without change in chronic diarrheal symptoms. He did, though, feel slightly better on oral vancomycin. After completing this, due to a recurrence in diarrheal episodes, he was empirically starting on a vancomycin taper, again with improvement in symptoms. He has 2-3 doses left. Now, he is having soft formed stool with resolution of loose stools, with 2-3 bowel movements per day, associated with straining and sensation of incomplete evacuation.  No abdominal pain, cramping, no BRBPR. No new other medications at time of diarrhea onset. Has been on metformin for a long time.    ROS: 10pt ROS performed and otherwise negative.    PAST MEDICAL HISTORY:  Past " Medical History:   Diagnosis Date     Diabetes mellitus (H)      Gastro-oesophageal reflux disease      History of blood transfusion      Hypertension      Pancreatic disease        PREVIOUS ABDOMINAL/GYNECOLOGIC SURGERIES:  Past Surgical History:   Procedure Laterality Date     APPENDECTOMY       ARTHROSCOPY SHOULDER ROTATOR CUFF REPAIR       COLONOSCOPY N/A 8/10/2022    Procedure: COLONOSCOPY, WITH POLYPECTOMY AND BIOPSY;  Surgeon: Darren Dye MD;  Location: [facility]     ENDOSCOPIC RETROGRADE CHOLANGIOPANCREATOGRAM  5/14/2013    Procedure: ENDOSCOPIC RETROGRADE CHOLANGIOPANCREATOGRAM;  Endoscopic retrograde cholangiopancreatography. Balloon Dilation of Duodenal Stricture with  Endogastric Duodenoscopy;  Surgeon: Jhony Khan MD;  Location: [facility]     ENDOSCOPIC ULTRASOUND UPPER GASTROINTESTINAL TRACT (GI) N/A 7/30/2015    Procedure: ENDOSCOPIC ULTRASOUND, ESOPHAGOSCOPY / UPPER GASTROINTESTINAL TRACT (GI);  Surgeon: Darren Dye MD;  Location: [facility]     ESOPHAGOSCOPY, GASTROSCOPY, DUODENOSCOPY (EGD), COMBINED  4/10/2013    Procedure: COMBINED ESOPHAGOSCOPY, GASTROSCOPY, DUODENOSCOPY (EGD), BIOPSY SINGLE OR MULTIPLE;;  Surgeon: Bright Carl MD;  Location: [facility]     ESOPHAGOSCOPY, GASTROSCOPY, DUODENOSCOPY (EGD), COMBINED  4/30/2013    Procedure: COMBINED ESOPHAGOSCOPY, GASTROSCOPY, DUODENOSCOPY (EGD);;  Surgeon: Darren Mejía MD;  Location: [facility]     ESOPHAGOSCOPY, GASTROSCOPY, DUODENOSCOPY (EGD), COMBINED  5/28/2013    Procedure: COMBINED ESOPHAGOSCOPY, GASTROSCOPY, DUODENOSCOPY (EGD);  Upper Endoscopy with Balloon Dilation *Latex Safe*;  Surgeon: Jhony Khan MD;  Location: [facility]     ESOPHAGOSCOPY, GASTROSCOPY, DUODENOSCOPY (EGD), COMBINED  6/18/2013    Procedure: COMBINED ESOPHAGOSCOPY, GASTROSCOPY, DUODENOSCOPY (EGD);  Upper Endoscopy with esophageal dilation;  Surgeon: Jhony Khan MD;  Location: [facility]     ESOPHAGOSCOPY, GASTROSCOPY, DUODENOSCOPY  (EGD), COMBINED N/A 4/4/2015    Procedure: COMBINED ESOPHAGOSCOPY, GASTROSCOPY, DUODENOSCOPY (EGD);  Surgeon: Rodney Montesinos MD;  Location: [facility]     ESOPHAGOSCOPY, GASTROSCOPY, DUODENOSCOPY (EGD), COMBINED Left 7/15/2015    Procedure: COMBINED ESOPHAGOSCOPY, GASTROSCOPY, DUODENOSCOPY (EGD), BIOPSY SINGLE OR MULTIPLE;  Surgeon: Rodney Montesinos MD;  Location: [facility]     ESOPHAGOSCOPY, GASTROSCOPY, DUODENOSCOPY (EGD), COMBINED N/A 8/8/2018    Procedure: COMBINED ESOPHAGOSCOPY, GASTROSCOPY, DUODENOSCOPY (EGD), BIOPSY SINGLE OR MULTIPLE;  EGD;  Surgeon: Darren Dye MD;  Location: [facility]     ESOPHAGOSCOPY, GASTROSCOPY, DUODENOSCOPY (EGD), COMBINED N/A 6/30/2021    Procedure: ESOPHAGOGASTRODUODENOSCOPY, WITH BIOPSY;  Surgeon: Darren Dye MD;  Location: [facility]     HERNIA REPAIR       LAPAROSCOPIC DUODENOJEJUNOSTOMY N/A 6/3/2022    Procedure: DUODENOJEJUNOSTOMY LAPAROSCOPIC;;  Surgeon: Davion Zuniga MD;  Location: [facility]     LAPAROSCOPIC HERNIORRHAPHY HIATAL N/A 6/3/2022    Procedure: HERNIORRHAPHY, HIATAL, LAPAROSCOPIC, GASTROPEXY;  Surgeon: Davion Zuniga MD;  Location: [facility]         PERTINENT MEDICATIONS:  Current Outpatient Medications   Medication Sig Dispense Refill     bisacodyl (DULCOLAX) 5 MG EC tablet Take as directed. One day before exam take 2 tablets at 3 PM. Take 2 tablets at 11 PM. 4 tablet 0     blood glucose (NO BRAND SPECIFIED) test strip 1 Strip by Misc.(Non-Drug; Combo Route) route.       CONTOUR NEXT TEST test strip USE TO TEST ONCE DAILY       dulaglutide (TRULICITY) 1.5 MG/0.5ML pen INJECT 0.5ML SUBCUTANEOUSLY ONCE WEEKLY       dutasteride (AVODART) 0.5 MG capsule Take 0.5 mg by mouth every 48 hours        empagliflozin (JARDIANCE) 25 MG TABS tablet Take 25 mg by mouth       fish oil-omega-3 fatty acids 1000 MG capsule Take 1 capsule by mouth       LISINOPRIL PO Take by mouth daily        metFORMIN (GLUCOPHAGE) 500 MG/5ML SOLN Take by mouth 2 times  daily (with meals)       metoprolol tartrate (LOPRESSOR) 25 MG tablet Take 2 tablets (50 mg) by mouth 2 times daily 60 tablet 0     Microlet Lancets MISC USE TO TEST ONCE DAILY       oxyCODONE (ROXICODONE) 5 MG tablet Take 1 tablet (5 mg) by mouth every 6 hours as needed for breakthrough pain 5 tablet 0     pantoprazole (PROTONIX) 40 MG enteric coated tablet Take 1 tablet (40 mg) by mouth daily 90 tablet 0     polyethylene glycol (GOLYTELY) 236 g suspension Take as directed. One day before exam fill the jug with water. Cover and shake until well mixed. At 6 PM start drinking an 8oz glass of mixture every 15 minutes until jug is 1/2 empty. Store remainder in the refrigerator.  At 11 PM Start drinking the other half of the Golytely jug. Drink one 8-ounce glass every 15 minutes until the jug is empty. 4000 mL 0     pregabalin (LYRICA) 50 MG capsule Take 50 mg by mouth 3 times daily       ROPINIROLE HCL PO Take 0.25 mg by mouth At Bedtime       simvastatin (ZOCOR) 40 MG tablet Take 1 tablet (40 mg) by mouth every evening 30 tablet      tamsulosin (FLOMAX) 0.4 MG capsule TAKE 1 CAPSULE (0.4 MG) BY MOUTH EVERY MORNING.       vancomycin (VANCOCIN) 125 MG capsule Take 1 capsule (125 mg) by mouth 4 times daily for 14 days, THEN 1 capsule (125 mg) 2 times daily for 7 days, THEN 1 capsule (125 mg) daily for 7 days, THEN 1 capsule (125 mg) every other day for 14 days. 84 capsule 0     VITAMIN D, CHOLECALCIFEROL, PO Take 2,000 Units by mouth daily        XARELTO ANTICOAGULANT 20 MG TABS tablet Take 20 mg by mouth daily         SOCIAL HISTORY:    Social History     Socioeconomic History     Marital status: Single     Spouse name: Not on file     Number of children: Not on file     Years of education: Not on file     Highest education level: Not on file   Occupational History     Not on file   Tobacco Use     Smoking status: Never     Smokeless tobacco: Never   Substance and Sexual Activity     Alcohol use: Yes     Comment: Rare  use     Drug use: No     Sexual activity: Not on file   Other Topics Concern     Parent/sibling w/ CABG, MI or angioplasty before 65F 55M? Not Asked   Social History Narrative    Retired, former      Social Determinants of Health     Financial Resource Strain: Not on file   Food Insecurity: Not on file   Transportation Needs: Not on file   Physical Activity: Not on file   Stress: Not on file   Social Connections: Not on file   Intimate Partner Violence: Not on file   Housing Stability: Not on file       FAMILY HISTORY:  Family History   Problem Relation Age of Onset     Cardiovascular Father      Peptic Ulcer Disease Mother        PHYSICAL EXAMINATION:  Vitals reviewed  There were no vitals taken for this visit.  Video physical exam  General: Patient appears well in no acute distress.   Skin: No visualized rash or lesions on visualized skin  Eyes: EOMI, no erythema, sclera icterus or discharge noted  Resp: Appears to be breathing comfortably without accessory muscle usage, speaking in full sentences, no cough  MSK: Appears to have normal range of motion based on visualized movements  Neurologic: No apparent tremors, facial movements symmetric  Psych: affect normal, alert and oriented    The rest of a comprehensive physical examination is deferred due to PHE (public health emergency) video restrictions      PERTINENT STUDIES Reviewed in EMR    ASSESSMENT/PLAN:    # Chronic diarrhea  # Positive C diff PCR  Presented with chronic diarrhea following hospitalization for duodenal obstruction in March 2022, with report of intermittent loose stools associated with fecal incontinence and fecal urgency. Work-up for chronic diarrhea including enteric pathogen testing, including C diff x2, fecal elastase, and colonoscopy with random biopsies was unrevealing. He was subsequently tested again for C diff PCR on 9/3/2022, this time positive, without antecedent antibiotic exposure. He did report partial symptom  improvement on a standard course of oral vancomycin, with recurrent, episodic loose stool following completion. He was empirically started on a vancomycin taper, which he remains on at this time, with resolution of loose stools.    We had a long discussion regarding his course thus far, as well as the diagnosis and management of recurrent C difficile infections. I do think it is plausible that his chronic diarrhea is not secondary to true C difficile infection, given he had previously tested negative on multiple occasions, without change in symptoms at time of positive test, and in the absence of any preceding antibiotic exposure. He does, though, improve on oral vancomycin, which could suggest true CDI as cause of his symptoms, though I question if his underlying duodenal stenosis and subsequent surgery could have predisposed him to SIBO, which could also be treated with oral vancomycin. Given his report of baseline hard stools consistent with BSC 1-2 stools preceding onset of diarrhea, overflow diarrhea is also possible.    At this time, given he has had only one recurrence of C diff, he would not be eligible for an IMT. We discussed competing the taper as prescribed. If he develops recurrent, persistent loose stools for at least 3 days, would recommend providing stool sample for C diff. Ideally, we would use our updated test that includes PCR as well as EIA and GDH, however this unlikely to be available locally. In that event, I would recommend C diff PCR in conjunction with a fecal calprotectin to help distinguish between colonization and true infection. We do not recommend stool testing to confirm a cure of C diff, as PCR test is likely to remain positive. Should he develop recurrent diarrhea, provided our clinic phone #. WE did also discuss fiber supplementation in effort to improve symptoms of incomplete evacuation and straining with bowel movements.    If he does have a future recurrence of C diff, even if  compatible with colonization, we do typically offer intestinal microbiota transplant (IMT) to those who are eligible. He is a good candidate for this given he does not require systemic antibiotics with any regular frequency.  We did discuss this, and he is interested in this should he have a future recurrence. He understands to contact our clinic with recurrent diarrhea and/or positive C dif testing, at which time we can arrange a clinic follow-up to discuss an IMT.      RTC PRN, pending clinical course    Thank you for this consultation. It was a pleasure to participate in the care of this patient; please contact us with any further questions.      Tomeka Plummer PA-C    77 minutes spent on the date of the encounter doing chart review, review of outside records, review of test results, interpretation of tests, patient visit and documentation

## 2022-11-08 NOTE — PROGRESS NOTES
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 He noted that he would have fecal incontinence at times, fecal urgency, and runny stools intermittently, with 2-3 bowel movements per day. Prior to this, he reports a typical pattern for him included \"normal\" hard stools, which could be BSC 1-3. In work-up of this diarrhea, he underwent multiple stool tests for enteric pathogens, which were negative, including negative stool testing for C diff PCR on at least two occasions. Fecal elastase normal. Colonoscopy was unrevealing, with negative random colon biopsies.    Due to ongoing diarrhea, C diff PCR was checked again 9/3/2022 and this time positive, without change in chronic diarrheal symptoms. He did, though, feel slightly better on oral vancomycin. After completing this, due to a recurrence in diarrheal episodes, he was empirically starting on a vancomycin taper, again with improvement in symptoms. He has 2-3 doses left. Now, he is having soft formed stool with resolution of loose stools, with 2-3 bowel movements per day, associated with straining and sensation of incomplete evacuation.  No abdominal pain, cramping, no BRBPR. No new other medications at time of diarrhea onset. Has been on metformin for a long time.    ROS: 10pt ROS performed and otherwise negative.    PAST MEDICAL HISTORY:  Past Medical History:   Diagnosis Date     Diabetes mellitus (H)      Gastro-oesophageal reflux disease      History of blood transfusion      Hypertension      Pancreatic disease        PREVIOUS ABDOMINAL/GYNECOLOGIC SURGERIES:  Past Surgical History:   Procedure Laterality " Date     APPENDECTOMY       ARTHROSCOPY SHOULDER ROTATOR CUFF REPAIR       COLONOSCOPY N/A 8/10/2022    Procedure: COLONOSCOPY, WITH POLYPECTOMY AND BIOPSY;  Surgeon: Darren Dye MD;  Location: [facility]     ENDOSCOPIC RETROGRADE CHOLANGIOPANCREATOGRAM  5/14/2013    Procedure: ENDOSCOPIC RETROGRADE CHOLANGIOPANCREATOGRAM;  Endoscopic retrograde cholangiopancreatography. Balloon Dilation of Duodenal Stricture with  Endogastric Duodenoscopy;  Surgeon: Jhony Khan MD;  Location: [facility]     ENDOSCOPIC ULTRASOUND UPPER GASTROINTESTINAL TRACT (GI) N/A 7/30/2015    Procedure: ENDOSCOPIC ULTRASOUND, ESOPHAGOSCOPY / UPPER GASTROINTESTINAL TRACT (GI);  Surgeon: Darren Dye MD;  Location: [facility]     ESOPHAGOSCOPY, GASTROSCOPY, DUODENOSCOPY (EGD), COMBINED  4/10/2013    Procedure: COMBINED ESOPHAGOSCOPY, GASTROSCOPY, DUODENOSCOPY (EGD), BIOPSY SINGLE OR MULTIPLE;;  Surgeon: Bright Carl MD;  Location: [facility]     ESOPHAGOSCOPY, GASTROSCOPY, DUODENOSCOPY (EGD), COMBINED  4/30/2013    Procedure: COMBINED ESOPHAGOSCOPY, GASTROSCOPY, DUODENOSCOPY (EGD);;  Surgeon: Darren Mejía MD;  Location: [facility]     ESOPHAGOSCOPY, GASTROSCOPY, DUODENOSCOPY (EGD), COMBINED  5/28/2013    Procedure: COMBINED ESOPHAGOSCOPY, GASTROSCOPY, DUODENOSCOPY (EGD);  Upper Endoscopy with Balloon Dilation *Latex Safe*;  Surgeon: Jhony Khan MD;  Location: [facility]     ESOPHAGOSCOPY, GASTROSCOPY, DUODENOSCOPY (EGD), COMBINED  6/18/2013    Procedure: COMBINED ESOPHAGOSCOPY, GASTROSCOPY, DUODENOSCOPY (EGD);  Upper Endoscopy with esophageal dilation;  Surgeon: Jhony Khan MD;  Location: [facility]     ESOPHAGOSCOPY, GASTROSCOPY, DUODENOSCOPY (EGD), COMBINED N/A 4/4/2015    Procedure: COMBINED ESOPHAGOSCOPY, GASTROSCOPY, DUODENOSCOPY (EGD);  Surgeon: Rodney Montesinos MD;  Location: [facility]     ESOPHAGOSCOPY, GASTROSCOPY, DUODENOSCOPY (EGD), COMBINED Left 7/15/2015    Procedure: COMBINED ESOPHAGOSCOPY,  GASTROSCOPY, DUODENOSCOPY (EGD), BIOPSY SINGLE OR MULTIPLE;  Surgeon: Rodney Montesinos MD;  Location: [facility]     ESOPHAGOSCOPY, GASTROSCOPY, DUODENOSCOPY (EGD), COMBINED N/A 8/8/2018    Procedure: COMBINED ESOPHAGOSCOPY, GASTROSCOPY, DUODENOSCOPY (EGD), BIOPSY SINGLE OR MULTIPLE;  EGD;  Surgeon: Darren Dye MD;  Location: [facility]     ESOPHAGOSCOPY, GASTROSCOPY, DUODENOSCOPY (EGD), COMBINED N/A 6/30/2021    Procedure: ESOPHAGOGASTRODUODENOSCOPY, WITH BIOPSY;  Surgeon: Darren Dye MD;  Location: [facility]     HERNIA REPAIR       LAPAROSCOPIC DUODENOJEJUNOSTOMY N/A 6/3/2022    Procedure: DUODENOJEJUNOSTOMY LAPAROSCOPIC;;  Surgeon: Davion Zuniga MD;  Location: [facility]     LAPAROSCOPIC HERNIORRHAPHY HIATAL N/A 6/3/2022    Procedure: HERNIORRHAPHY, HIATAL, LAPAROSCOPIC, GASTROPEXY;  Surgeon: Davion Zuniga MD;  Location: [facility]         PERTINENT MEDICATIONS:  Current Outpatient Medications   Medication Sig Dispense Refill     bisacodyl (DULCOLAX) 5 MG EC tablet Take as directed. One day before exam take 2 tablets at 3 PM. Take 2 tablets at 11 PM. 4 tablet 0     blood glucose (NO BRAND SPECIFIED) test strip 1 Strip by Misc.(Non-Drug; Combo Route) route.       CONTOUR NEXT TEST test strip USE TO TEST ONCE DAILY       dulaglutide (TRULICITY) 1.5 MG/0.5ML pen INJECT 0.5ML SUBCUTANEOUSLY ONCE WEEKLY       dutasteride (AVODART) 0.5 MG capsule Take 0.5 mg by mouth every 48 hours        empagliflozin (JARDIANCE) 25 MG TABS tablet Take 25 mg by mouth       fish oil-omega-3 fatty acids 1000 MG capsule Take 1 capsule by mouth       LISINOPRIL PO Take by mouth daily        metFORMIN (GLUCOPHAGE) 500 MG/5ML SOLN Take by mouth 2 times daily (with meals)       metoprolol tartrate (LOPRESSOR) 25 MG tablet Take 2 tablets (50 mg) by mouth 2 times daily 60 tablet 0     Microlet Lancets MISC USE TO TEST ONCE DAILY       oxyCODONE (ROXICODONE) 5 MG tablet Take 1 tablet (5 mg) by mouth every 6 hours as needed  for breakthrough pain 5 tablet 0     pantoprazole (PROTONIX) 40 MG enteric coated tablet Take 1 tablet (40 mg) by mouth daily 90 tablet 0     polyethylene glycol (GOLYTELY) 236 g suspension Take as directed. One day before exam fill the jug with water. Cover and shake until well mixed. At 6 PM start drinking an 8oz glass of mixture every 15 minutes until jug is 1/2 empty. Store remainder in the refrigerator.  At 11 PM Start drinking the other half of the Golytely jug. Drink one 8-ounce glass every 15 minutes until the jug is empty. 4000 mL 0     pregabalin (LYRICA) 50 MG capsule Take 50 mg by mouth 3 times daily       ROPINIROLE HCL PO Take 0.25 mg by mouth At Bedtime       simvastatin (ZOCOR) 40 MG tablet Take 1 tablet (40 mg) by mouth every evening 30 tablet      tamsulosin (FLOMAX) 0.4 MG capsule TAKE 1 CAPSULE (0.4 MG) BY MOUTH EVERY MORNING.       vancomycin (VANCOCIN) 125 MG capsule Take 1 capsule (125 mg) by mouth 4 times daily for 14 days, THEN 1 capsule (125 mg) 2 times daily for 7 days, THEN 1 capsule (125 mg) daily for 7 days, THEN 1 capsule (125 mg) every other day for 14 days. 84 capsule 0     VITAMIN D, CHOLECALCIFEROL, PO Take 2,000 Units by mouth daily        XARELTO ANTICOAGULANT 20 MG TABS tablet Take 20 mg by mouth daily         SOCIAL HISTORY:    Social History     Socioeconomic History     Marital status: Single     Spouse name: Not on file     Number of children: Not on file     Years of education: Not on file     Highest education level: Not on file   Occupational History     Not on file   Tobacco Use     Smoking status: Never     Smokeless tobacco: Never   Substance and Sexual Activity     Alcohol use: Yes     Comment: Rare use     Drug use: No     Sexual activity: Not on file   Other Topics Concern     Parent/sibling w/ CABG, MI or angioplasty before 65F 55M? Not Asked   Social History Narrative    Retired, former      Social Determinants of Health     Financial Resource  Strain: Not on file   Food Insecurity: Not on file   Transportation Needs: Not on file   Physical Activity: Not on file   Stress: Not on file   Social Connections: Not on file   Intimate Partner Violence: Not on file   Housing Stability: Not on file       FAMILY HISTORY:  Family History   Problem Relation Age of Onset     Cardiovascular Father      Peptic Ulcer Disease Mother        PHYSICAL EXAMINATION:  Vitals reviewed  There were no vitals taken for this visit.  Video physical exam  General: Patient appears well in no acute distress.   Skin: No visualized rash or lesions on visualized skin  Eyes: EOMI, no erythema, sclera icterus or discharge noted  Resp: Appears to be breathing comfortably without accessory muscle usage, speaking in full sentences, no cough  MSK: Appears to have normal range of motion based on visualized movements  Neurologic: No apparent tremors, facial movements symmetric  Psych: affect normal, alert and oriented    The rest of a comprehensive physical examination is deferred due to PHE (public health emergency) video restrictions      PERTINENT STUDIES Reviewed in EMR    ASSESSMENT/PLAN:    # Chronic diarrhea  # Positive C diff PCR  Presented with chronic diarrhea following hospitalization for duodenal obstruction in March 2022, with report of intermittent loose stools associated with fecal incontinence and fecal urgency. Work-up for chronic diarrhea including enteric pathogen testing, including C diff x2, fecal elastase, and colonoscopy with random biopsies was unrevealing. He was subsequently tested again for C diff PCR on 9/3/2022, this time positive, without antecedent antibiotic exposure. He did report partial symptom improvement on a standard course of oral vancomycin, with recurrent, episodic loose stool following completion. He was empirically started on a vancomycin taper, which he remains on at this time, with resolution of loose stools.    We had a long discussion regarding his  course thus far, as well as the diagnosis and management of recurrent C difficile infections. I do think it is plausible that his chronic diarrhea is not secondary to true C difficile infection, given he had previously tested negative on multiple occasions, without change in symptoms at time of positive test, and in the absence of any preceding antibiotic exposure. He does, though, improve on oral vancomycin, which could suggest true CDI as cause of his symptoms, though I question if his underlying duodenal stenosis and subsequent surgery could have predisposed him to SIBO, which could also be treated with oral vancomycin. Given his report of baseline hard stools consistent with BSC 1-2 stools preceding onset of diarrhea, overflow diarrhea is also possible.    At this time, given he has had only one recurrence of C diff, he would not be eligible for an IMT. We discussed competing the taper as prescribed. If he develops recurrent, persistent loose stools for at least 3 days, would recommend providing stool sample for C diff. Ideally, we would use our updated test that includes PCR as well as EIA and GDH, however this unlikely to be available locally. In that event, I would recommend C diff PCR in conjunction with a fecal calprotectin to help distinguish between colonization and true infection. We do not recommend stool testing to confirm a cure of C diff, as PCR test is likely to remain positive. Should he develop recurrent diarrhea, provided our clinic phone #. WE did also discuss fiber supplementation in effort to improve symptoms of incomplete evacuation and straining with bowel movements.    If he does have a future recurrence of C diff, even if compatible with colonization, we do typically offer intestinal microbiota transplant (IMT) to those who are eligible. He is a good candidate for this given he does not require systemic antibiotics with any regular frequency.  We did discuss this, and he is interested in  this should he have a future recurrence. He understands to contact our clinic with recurrent diarrhea and/or positive C dif testing, at which time we can arrange a clinic follow-up to discuss an IMT.      RTC PRN, pending clinical course    Thank you for this consultation. It was a pleasure to participate in the care of this patient; please contact us with any further questions.    Tomeka Plummer PA-C    77 minutes spent on the date of the encounter doing chart review, review of outside records, review of test results, interpretation of tests, patient visit and documentation

## 2022-11-17 ENCOUNTER — TELEPHONE (OUTPATIENT)
Dept: GASTROENTEROLOGY | Facility: CLINIC | Age: 72
End: 2022-11-17

## 2022-11-17 DIAGNOSIS — R19.7 DIARRHEA, UNSPECIFIED TYPE: ICD-10-CM

## 2022-11-17 DIAGNOSIS — A04.72 C. DIFFICILE COLITIS: Primary | ICD-10-CM

## 2022-11-17 NOTE — TELEPHONE ENCOUNTER
[facility] Call Center    Phone Message    May a detailed message be left on voicemail: yes     Reason for Call: Other: Patient recently spoke with Tomeka Plummer on 11/08/2022 about recurring C Diff, and the patient called back expressing that he currently still has diarrhea after the medication he took. He is unsure what he should be doing now, therefore he is rquesting a call back from the team to discuss what to do moving forward. Please call him back at [phone], thank you!     Action Taken: Message routed to:  [facility] ([facility]    Travel Screening: Not Applicable

## 2022-11-17 NOTE — TELEPHONE ENCOUNTER
Returned call and left detailed vm including that stool lab is now available for pt to get done for c-diff per provider's recommendation.  2. If any recurrent diarrhea (for example, 3 or more loose stools daily for at least 3 days in a row) please call our clinic or go to PCP to set up a stool sample.  3. If the stool test is positive again for C diff, please let us know so that we can discuss setting of a stool transplant if you are interested.

## 2022-11-21 ENCOUNTER — PATIENT OUTREACH (OUTPATIENT)
Dept: GASTROENTEROLOGY | Facility: CLINIC | Age: 72
End: 2022-11-21

## 2022-11-21 NOTE — TELEPHONE ENCOUNTER
Returned call to pt regarding follow up for continued loose stools.  Per provider, pt to get c-diff lab test done.  Left detailed vm including that lab is ordered and can be done at any time.

## 2023-05-10 ENCOUNTER — PATIENT OUTREACH (OUTPATIENT)
Dept: GASTROENTEROLOGY | Facility: CLINIC | Age: 73
End: 2023-05-10
Payer: COMMERCIAL

## 2023-05-10 NOTE — CONFIDENTIAL NOTE
Pt called he had a recent admission to [facility] with recurrence of his cdiff on 5/1/23. He saw an internist today who asked that he get scheduled with GI again. Her note states :    Patient was admitted to [facility] 5/1/2023 and discharged 5-23 with recurrent C. difficile diarrhea. Reported to the ER with nausea vomiting and diarrhea. Has a history of gastric outlet obstruction and annular pancreas treated operatively. He has had C. difficile in the past. Follows with GI specialist. Was put on a prolonged taper of vancomycin for this. Patient was evaluated by surgery for pneumobilia seen on CT. Due to previous surgery and no surgical intervention required. On day 2 in the hospital his nausea vomiting and diarrhea resolved and wanted to return home. Patient was placed on a prolonged taper of vancomycin again. Recommend follow-up with GI for recurrent C. Difficile.    Message routed to Tomeka Plummer who has seen him for cdiff in November. Pt currently scheduled to see Dr Navarro in Sept and is hoping for an earlier consult.    Jennifer Hendricks, RN, BSN,   [facility]  Care coordinator

## 2023-05-16 ENCOUNTER — TELEPHONE (OUTPATIENT)
Dept: GASTROENTEROLOGY | Facility: CLINIC | Age: 73
End: 2023-05-16
Payer: COMMERCIAL

## 2023-05-16 NOTE — TELEPHONE ENCOUNTER
Called and left voice message for Pt. Called Pt to help get them scheduled for a sooner GI appointment with Tomeka Plummer. Writer left call back number.

## 2023-05-30 ENCOUNTER — VIRTUAL VISIT (OUTPATIENT)
Dept: GASTROENTEROLOGY | Facility: CLINIC | Age: 73
End: 2023-05-30
Payer: COMMERCIAL

## 2023-05-30 DIAGNOSIS — A04.72 C. DIFFICILE COLITIS: Primary | ICD-10-CM

## 2023-05-30 PROCEDURE — 99417 PROLNG OP E/M EACH 15 MIN: CPT | Mod: VID | Performed by: PHYSICIAN ASSISTANT

## 2023-05-30 PROCEDURE — 99215 OFFICE O/P EST HI 40 MIN: CPT | Mod: VID | Performed by: PHYSICIAN ASSISTANT

## 2023-05-30 NOTE — LETTER
5/30/2023         RE: Clyde Figueroa  [id number] [facility] [place] [id number]        Dear Colleague,    Thank you for referring your patient, Clyde Figueroa, to the [facility] [place]. Please see a copy of my visit note below.    GI CLINIC VISIT    Recurrent C.Diff Questionnaire     Please rate your symptoms from 0 to 100 based on severity  0-19 None or minimal  20-39 Not very severe  40-59 Quite severe  60-79 Severe   Very severe    1. How severe were your abdominal pains/cramps over the past week? 0-19 None or Minimal     2. How severe is your abdominal pain at this time? 0-19 None or Minimal     3. If you have abdominal distension, how severe is it (over the past week)? 0-19 None or Minimal     4. How satisfied are you with your bowel habits? 0-19 None or Minimal     5. How much do your abdominal symptoms interfere with your life in general? 0-19 None or Minimal       In addition, please answer these questions:    1. How many bowel movements do you have per day (average over the past week)? 1-2  0-1  1-2  2-3  3-5  6-10  > 10    2. What is the consistency of your stools? Formed  Watery  Loose  Formed  Hard    3. Did you have any episodes of fecal incontinence in the past week? No    4. 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    Clyde initially consulted in our clinic for recurrent C difficile infections. 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 He described 2-3 bowel movements daily, watery and associated with fecal  incontinence. In work-up of this diarrhea, he underwent multiple stool tests for enteric pathogens, which were negative, including negative stool testing for C diff PCR on at least two occasions. Fecal elastase normal. Colonoscopy was unrevealing, with negative random colon biopsies.    Due to ongoing diarrhea, C diff PCR was checked again 9/3/2022 and this time positive, without change in chronic diarrheal symptoms at time of this testing. He was started on oral vancomycin. He did, though, feel slightly better on oral vancomycin. After completing this, due to a recurrence in diarrheal episodes, he was empirically starting on a vancomycin taper, again with improvement in symptoms. At time of consultation November 2022, he was still on a vancomycin taper, and noting 2-3 formed stools daily. Given he had only had one spontaneous recurrence, we discussed monitoring following completion of taper.    After completing the taper, he states he did well with formed stools around the end of April 2023, at which time he developed stomach upset for three days, followed by vomiting and diarrhea. He had at least 5-6 episodes of vomiting. The emesis was partially digested and also partially undigested. The diarrhea was uncontrollable, with unclear number of stools prior to the hospital. HIs wife states the diarrhea started about 3 days prior to hospital stay, with around 3-4 loose stools per day. Unclear if nocturnal awakenings present. Stools were mainly water. No BRBPR. No abdominal cramping. No antibiotics. No fever.  Due to these symptoms, he presented to [facility] ED May 2023 with nausea, vomiting, and diarrhea. C diff positive by PCR. There was no confirmatory toxin EIA. CRP was negative initially, but increased to 24.5 on recheck the day following presentation, procalcitonin was mildly elevated at 0.64. CBC was unremarkable. CT of the abdomen and pelvis showed a normal colon. The stomach and proximal duodenum was  distended. By the morning of hospital day 2, the symptoms were entirely resolved.     He remains on an oral vancomycin taper With this, he is having a daily bowel movement, formed, without straining lately. No BRBPR. No further nausea, vomiting. No early satiety, unintentional weight loss.    He did recently get bit by a deer tick with local erythema, urgent care deferred treatment with doxycycline due to recent C diff.    ROS: 10pt ROS performed and otherwise negative.    PAST MEDICAL HISTORY:  Past Medical History:   Diagnosis Date    Diabetes mellitus (H)     Gastro-oesophageal reflux disease     History of blood transfusion     Hypertension     Pancreatic disease        PREVIOUS ABDOMINAL/GYNECOLOGIC SURGERIES:  Past Surgical History:   Procedure Laterality Date    APPENDECTOMY      ARTHROSCOPY SHOULDER ROTATOR CUFF REPAIR      COLONOSCOPY N/A 8/10/2022    Procedure: COLONOSCOPY, WITH POLYPECTOMY AND BIOPSY;  Surgeon: Darren Dye MD;  Location: [facility]    ENDOSCOPIC RETROGRADE CHOLANGIOPANCREATOGRAM  5/14/2013    Procedure: ENDOSCOPIC RETROGRADE CHOLANGIOPANCREATOGRAM;  Endoscopic retrograde cholangiopancreatography. Balloon Dilation of Duodenal Stricture with  Endogastric Duodenoscopy;  Surgeon: Jhony Khan MD;  Location: [facility]    ENDOSCOPIC ULTRASOUND UPPER GASTROINTESTINAL TRACT (GI) N/A 7/30/2015    Procedure: ENDOSCOPIC ULTRASOUND, ESOPHAGOSCOPY / UPPER GASTROINTESTINAL TRACT (GI);  Surgeon: Darren Dye MD;  Location: [facility]    ESOPHAGOSCOPY, GASTROSCOPY, DUODENOSCOPY (EGD), COMBINED  4/10/2013    Procedure: COMBINED ESOPHAGOSCOPY, GASTROSCOPY, DUODENOSCOPY (EGD), BIOPSY SINGLE OR MULTIPLE;;  Surgeon: Bright Carl MD;  Location: [facility]    ESOPHAGOSCOPY, GASTROSCOPY, DUODENOSCOPY (EGD), COMBINED  4/30/2013    Procedure: COMBINED ESOPHAGOSCOPY, GASTROSCOPY, DUODENOSCOPY (EGD);;  Surgeon: Darren Mejía MD;  Location: [facility]    ESOPHAGOSCOPY, GASTROSCOPY, DUODENOSCOPY  (EGD), COMBINED  5/28/2013    Procedure: COMBINED ESOPHAGOSCOPY, GASTROSCOPY, DUODENOSCOPY (EGD);  Upper Endoscopy with Balloon Dilation *Latex Safe*;  Surgeon: Jhony Khan MD;  Location: [facility]    ESOPHAGOSCOPY, GASTROSCOPY, DUODENOSCOPY (EGD), COMBINED  6/18/2013    Procedure: COMBINED ESOPHAGOSCOPY, GASTROSCOPY, DUODENOSCOPY (EGD);  Upper Endoscopy with esophageal dilation;  Surgeon: Jhony Khan MD;  Location: [facility]    ESOPHAGOSCOPY, GASTROSCOPY, DUODENOSCOPY (EGD), COMBINED N/A 4/4/2015    Procedure: COMBINED ESOPHAGOSCOPY, GASTROSCOPY, DUODENOSCOPY (EGD);  Surgeon: Rodney Montesinos MD;  Location: [facility]    ESOPHAGOSCOPY, GASTROSCOPY, DUODENOSCOPY (EGD), COMBINED Left 7/15/2015    Procedure: COMBINED ESOPHAGOSCOPY, GASTROSCOPY, DUODENOSCOPY (EGD), BIOPSY SINGLE OR MULTIPLE;  Surgeon: Rodney Montesinos MD;  Location: [facility]    ESOPHAGOSCOPY, GASTROSCOPY, DUODENOSCOPY (EGD), COMBINED N/A 8/8/2018    Procedure: COMBINED ESOPHAGOSCOPY, GASTROSCOPY, DUODENOSCOPY (EGD), BIOPSY SINGLE OR MULTIPLE;  EGD;  Surgeon: Darren Dye MD;  Location: [facility]    ESOPHAGOSCOPY, GASTROSCOPY, DUODENOSCOPY (EGD), COMBINED N/A 6/30/2021    Procedure: ESOPHAGOGASTRODUODENOSCOPY, WITH BIOPSY;  Surgeon: Darren Dye MD;  Location: [facility]    HERNIA REPAIR      LAPAROSCOPIC DUODENOJEJUNOSTOMY N/A 6/3/2022    Procedure: DUODENOJEJUNOSTOMY LAPAROSCOPIC;;  Surgeon: Davion Zuniga MD;  Location: [facility]    LAPAROSCOPIC HERNIORRHAPHY HIATAL N/A 6/3/2022    Procedure: HERNIORRHAPHY, HIATAL, LAPAROSCOPIC, GASTROPEXY;  Surgeon: Davion Zuniga MD;  Location: [facility]         PERTINENT MEDICATIONS:  Current Outpatient Medications   Medication Sig Dispense Refill    bisacodyl (DULCOLAX) 5 MG EC tablet Take as directed. One day before exam take 2 tablets at 3 PM. Take 2 tablets at 11 PM. (Patient not taking: Reported on 11/8/2022) 4 tablet 0    blood glucose (NO BRAND SPECIFIED) test strip 1  Strip by Misc.(Non-Drug; Combo Route) route.      CONTOUR NEXT TEST test strip USE TO TEST ONCE DAILY      dulaglutide (TRULICITY) 1.5 MG/0.5ML pen INJECT 0.5ML SUBCUTANEOUSLY ONCE WEEKLY      dutasteride (AVODART) 0.5 MG capsule Take 0.5 mg by mouth every 48 hours       empagliflozin (JARDIANCE) 25 MG TABS tablet Take 25 mg by mouth      fish oil-omega-3 fatty acids 1000 MG capsule Take 1 capsule by mouth (Patient not taking: Reported on 11/8/2022)      LISINOPRIL PO Take by mouth daily       metFORMIN (GLUCOPHAGE) 500 MG/5ML SOLN Take by mouth 2 times daily (with meals)      metoprolol tartrate (LOPRESSOR) 25 MG tablet Take 2 tablets (50 mg) by mouth 2 times daily 60 tablet 0    Microlet Lancets MISC USE TO TEST ONCE DAILY      oxyCODONE (ROXICODONE) 5 MG tablet Take 1 tablet (5 mg) by mouth every 6 hours as needed for breakthrough pain 5 tablet 0    pantoprazole (PROTONIX) 40 MG enteric coated tablet Take 1 tablet (40 mg) by mouth daily 90 tablet 0    polyethylene glycol (GOLYTELY) 236 g suspension Take as directed. One day before exam fill the jug with water. Cover and shake until well mixed. At 6 PM start drinking an 8oz glass of mixture every 15 minutes until jug is 1/2 empty. Store remainder in the refrigerator.  At 11 PM Start drinking the other half of the Golytely jug. Drink one 8-ounce glass every 15 minutes until the jug is empty. 4000 mL 0    pregabalin (LYRICA) 50 MG capsule Take 50 mg by mouth 3 times daily      ROPINIROLE HCL PO Take 0.25 mg by mouth At Bedtime      simvastatin (ZOCOR) 40 MG tablet Take 1 tablet (40 mg) by mouth every evening 30 tablet     tamsulosin (FLOMAX) 0.4 MG capsule TAKE 1 CAPSULE (0.4 MG) BY MOUTH EVERY MORNING.      VITAMIN D, CHOLECALCIFEROL, PO Take 2,000 Units by mouth daily       XARELTO ANTICOAGULANT 20 MG TABS tablet Take 20 mg by mouth daily         SOCIAL HISTORY:    Social History     Socioeconomic History    Marital status: Single     Spouse name: Not on file     Number of children: Not on file    Years of education: Not on file    Highest education level: Not on file   Occupational History    Not on file   Tobacco Use    Smoking status: Never    Smokeless tobacco: Never   Vaping Use    Vaping status: Not on file   Substance and Sexual Activity    Alcohol use: Yes     Comment: Rare use    Drug use: No    Sexual activity: Not on file   Other Topics Concern    Parent/sibling w/ CABG, MI or angioplasty before 65F 55M? Not Asked   Social History Narrative    Retired, former      Social Determinants of Health     Financial Resource Strain: Not on file   Food Insecurity: Not on file   Transportation Needs: Not on file   Physical Activity: Not on file   Stress: Not on file   Social Connections: Not on file   Intimate Partner Violence: Not on file   Housing Stability: Not on file       FAMILY HISTORY:  Family History   Problem Relation Age of Onset    Cardiovascular Father     Peptic Ulcer Disease Mother        PHYSICAL EXAMINATION:  Vitals reviewed  There were no vitals taken for this visit.  Video physical exam  General: Patient appears well in no acute distress.   Skin: No visualized rash or lesions on visualized skin  Eyes: EOMI, no erythema, sclera icterus or discharge noted  Resp: Appears to be breathing comfortably without accessory muscle usage, speaking in full sentences, no cough  MSK: Appears to have normal range of motion based on visualized movements  Neurologic: No apparent tremors, facial movements symmetric  Psych: affect normal, alert and oriented    The rest of a comprehensive physical examination is deferred due to PHE (public health emergency) video restrictions      PERTINENT STUDIES Reviewed in EMR    ASSESSMENT/PLAN:    # Positive C diff PCR  Clyde initially presented with chronic diarrhea following hospitalization for duodenal obstruction in March 2022, with report of intermittent loose stools associated with fecal incontinence and fecal  urgency. Work-up for chronic diarrhea including enteric pathogen testing, including C diff x2, fecal elastase, and colonoscopy with random biopsies was unrevealing. He was subsequently tested again for C diff PCR on 9/3/2022, this time positive, without antecedent antibiotic exposure outside of a single dose of perioperative Ancef 6/2022. He did report partial symptom improvement on a standard course of oral vancomycin, with recurrent, episodic loose stool following completion. He was empirically started on a vancomycin taper, with symptom resolution.    He did well for around seven months, and then abruptly developed nausea, vomiting, and diarrhea. No known sick contacts or preceding antibiotic exposures. He went to the ED and was subsequently admitted, with work-up notable for a normal CBC, and unremarkable colon on CT. Stool testing was positive for C diff by PCR. Toxin EIA was not performed, nor was a fecal calprotectin.    We discussed that it would be quite unusual to have a spontaneous C diff recurrence seven months after initial episode. Generally, any new infection greater than 6 months from previous infection would be considered a novel C difficile episode. There were no preceding antibiotic exposures, which make true C difficile infection less likely. We discussed the limitations of using C diff PCR alone to evaluate for C difficile infection. This can be indicative of colonization, and can be positive due to previous history of CDI, as well as from being in the hospital. Given the lack of inflammatory change in the colon or leukocytosis, as well as the clinical presentation, an viral gastroenteritis could have been the cause of his symptoms as well. His case was reviewed with Dr. Navarro, with the agreement to complete taper, and monitor for recurrence. If he develops recurrent, persistent loose stools for at least 3 days, would recommend providing stool sample for C diff. Ideally, we would use our  updated test that includes PCR as well as EIA and GDH, however this unlikely to be available locally. In that event, I would recommend C diff PCR in conjunction with a fecal calprotectin to help distinguish between colonization and true infection.     If he does have a future recurrence of C diff, even if compatible with colonization, we do typically offer intestinal microbiota transplant (IMT) to those who are eligible. He is a good candidate for this given he does not require systemic antibiotics with any regular frequency.  We did discuss this, and he is interested in this should he have a future recurrence. He understands to contact our clinic with recurrent diarrhea and/or positive C dif testing, at which time we can arrange a clinic follow-up to discuss an IMT.    In regard to pending treatment for Lyme's, encouraged him to initiate the doxycyline now while on the vancomycin. He is asymptomatic from a GI standpoint.     RTC PRN, pending clinical course    Thank you for this consultation. It was a pleasure to participate in the care of this patient; please contact us with any further questions.      73 minutes spent on the date of the encounter doing chart review, review of outside records, review of test results, interpretation of tests, patient visit and documentation, discussing with provider          Again, thank you for allowing me to participate in the care of your patient.      Sincerely,    Tomeka Plummer PA-C

## 2023-05-30 NOTE — TELEPHONE ENCOUNTER
Pt called back. Pt is scheduled for an appointment with Tomeka Plummer on 5/30/2023 at 1:45pm for a video visit.

## 2023-05-30 NOTE — TELEPHONE ENCOUNTER
Called Pt to help get him scheduled for a sooner appointment. Spoke with Pt's spouse and left call back number for Pt to call back.

## 2023-05-30 NOTE — PROGRESS NOTES
GI CLINIC VISIT    Virtual Visit Details    Type of service:  Video Visit   Video Start Time: 145  Video End Time:220    Originating Location (pt. Location): Home    Distant Location (provider location):  Off-site  Platform used for Video Visit: [email or web address]    Recurrent C.Diff Questionnaire     Please rate your symptoms from 0 to 100 based on severity  0-19 None or minimal  20-39 Not very severe  40-59 Quite severe  60-79 Severe   Very severe    1. How severe were your abdominal pains/cramps over the past week? 0-19 None or Minimal     2. How severe is your abdominal pain at this time? 0-19 None or Minimal     3. If you have abdominal distension, how severe is it (over the past week)? 0-19 None or Minimal     4. How satisfied are you with your bowel habits? 0-19 None or Minimal     5. How much do your abdominal symptoms interfere with your life in general? 0-19 None or Minimal       In addition, please answer these questions:    1. How many bowel movements do you have per day (average over the past week)? 1-2    0-1    1-2    2-3    3-5    6-10    > 10    2. What is the consistency of your stools? Formed    Watery    Loose    Formed    Hard    3. Did you have any episodes of fecal incontinence in the past week? No    4. 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    Clyde initially consulted in our clinic for recurrent C difficile infections. 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 He described 2-3 bowel movements daily, watery and associated with fecal  incontinence. In work-up of this diarrhea, he underwent multiple stool tests for enteric pathogens, which were negative, including negative stool testing for C diff PCR on at least two occasions. Fecal elastase normal. Colonoscopy was unrevealing, with negative random colon biopsies.    Due to ongoing diarrhea, C diff PCR was checked again 9/3/2022 and this time positive, without change in chronic diarrheal symptoms at time of this testing. He was started on oral vancomycin. He did, though, feel slightly better on oral vancomycin. After completing this, due to a recurrence in diarrheal episodes, he was empirically starting on a vancomycin taper, again with improvement in symptoms. At time of consultation November 2022, he was still on a vancomycin taper, and noting 2-3 formed stools daily. Given he had only had one spontaneous recurrence, we discussed monitoring following completion of taper.    After completing the taper, he states he did well with formed stools around the end of April 2023, at which time he developed stomach upset for three days, followed by vomiting and diarrhea. He had at least 5-6 episodes of vomiting. The emesis was partially digested and also partially undigested. The diarrhea was uncontrollable, with unclear number of stools prior to the hospital. HIs wife states the diarrhea started about 3 days prior to hospital stay, with around 3-4 loose stools per day. Unclear if nocturnal awakenings present. Stools were mainly water. No BRBPR. No abdominal cramping. No antibiotics. No fever.  Due to these symptoms, he presented to [facility] ED May 2023 with nausea, vomiting, and diarrhea. C diff positive by PCR. There was no confirmatory toxin EIA. CRP was negative initially, but increased to 24.5 on recheck the day following presentation, procalcitonin was mildly elevated at 0.64. CBC was unremarkable. CT of the abdomen and pelvis showed a normal colon. The stomach and proximal duodenum was  distended. By the morning of hospital day 2, the symptoms were entirely resolved.     He remains on an oral vancomycin taper With this, he is having a daily bowel movement, formed, without straining lately. No BRBPR. No further nausea, vomiting. No early satiety, unintentional weight loss.    He did recently get bit by a deer tick with local erythema, urgent care deferred treatment with doxycycline due to recent C diff.    ROS: 10pt ROS performed and otherwise negative.    PAST MEDICAL HISTORY:  Past Medical History:   Diagnosis Date     Diabetes mellitus (H)      Gastro-oesophageal reflux disease      History of blood transfusion      Hypertension      Pancreatic disease        PREVIOUS ABDOMINAL/GYNECOLOGIC SURGERIES:  Past Surgical History:   Procedure Laterality Date     APPENDECTOMY       ARTHROSCOPY SHOULDER ROTATOR CUFF REPAIR       COLONOSCOPY N/A 8/10/2022    Procedure: COLONOSCOPY, WITH POLYPECTOMY AND BIOPSY;  Surgeon: Darren Dye MD;  Location: [facility]     ENDOSCOPIC RETROGRADE CHOLANGIOPANCREATOGRAM  5/14/2013    Procedure: ENDOSCOPIC RETROGRADE CHOLANGIOPANCREATOGRAM;  Endoscopic retrograde cholangiopancreatography. Balloon Dilation of Duodenal Stricture with  Endogastric Duodenoscopy;  Surgeon: Jhony Khan MD;  Location: [facility]     ENDOSCOPIC ULTRASOUND UPPER GASTROINTESTINAL TRACT (GI) N/A 7/30/2015    Procedure: ENDOSCOPIC ULTRASOUND, ESOPHAGOSCOPY / UPPER GASTROINTESTINAL TRACT (GI);  Surgeon: Darren Dye MD;  Location: [facility]     ESOPHAGOSCOPY, GASTROSCOPY, DUODENOSCOPY (EGD), COMBINED  4/10/2013    Procedure: COMBINED ESOPHAGOSCOPY, GASTROSCOPY, DUODENOSCOPY (EGD), BIOPSY SINGLE OR MULTIPLE;;  Surgeon: Bright Carl MD;  Location: [facility]     ESOPHAGOSCOPY, GASTROSCOPY, DUODENOSCOPY (EGD), COMBINED  4/30/2013    Procedure: COMBINED ESOPHAGOSCOPY, GASTROSCOPY, DUODENOSCOPY (EGD);;  Surgeon: Darren Mejía MD;  Location: [facility]     ESOPHAGOSCOPY, GASTROSCOPY,  DUODENOSCOPY (EGD), COMBINED  5/28/2013    Procedure: COMBINED ESOPHAGOSCOPY, GASTROSCOPY, DUODENOSCOPY (EGD);  Upper Endoscopy with Balloon Dilation *Latex Safe*;  Surgeon: Jhony Khan MD;  Location: [facility]     ESOPHAGOSCOPY, GASTROSCOPY, DUODENOSCOPY (EGD), COMBINED  6/18/2013    Procedure: COMBINED ESOPHAGOSCOPY, GASTROSCOPY, DUODENOSCOPY (EGD);  Upper Endoscopy with esophageal dilation;  Surgeon: Jhony Khan MD;  Location: [facility]     ESOPHAGOSCOPY, GASTROSCOPY, DUODENOSCOPY (EGD), COMBINED N/A 4/4/2015    Procedure: COMBINED ESOPHAGOSCOPY, GASTROSCOPY, DUODENOSCOPY (EGD);  Surgeon: Rodney Montesinos MD;  Location: [facility]     ESOPHAGOSCOPY, GASTROSCOPY, DUODENOSCOPY (EGD), COMBINED Left 7/15/2015    Procedure: COMBINED ESOPHAGOSCOPY, GASTROSCOPY, DUODENOSCOPY (EGD), BIOPSY SINGLE OR MULTIPLE;  Surgeon: Rodney Montesinos MD;  Location: [facility]     ESOPHAGOSCOPY, GASTROSCOPY, DUODENOSCOPY (EGD), COMBINED N/A 8/8/2018    Procedure: COMBINED ESOPHAGOSCOPY, GASTROSCOPY, DUODENOSCOPY (EGD), BIOPSY SINGLE OR MULTIPLE;  EGD;  Surgeon: Darren Dye MD;  Location: [facility]     ESOPHAGOSCOPY, GASTROSCOPY, DUODENOSCOPY (EGD), COMBINED N/A 6/30/2021    Procedure: ESOPHAGOGASTRODUODENOSCOPY, WITH BIOPSY;  Surgeon: Darren Dye MD;  Location: [facility]     HERNIA REPAIR       LAPAROSCOPIC DUODENOJEJUNOSTOMY N/A 6/3/2022    Procedure: DUODENOJEJUNOSTOMY LAPAROSCOPIC;;  Surgeon: Davion Zuniga MD;  Location: [facility]     LAPAROSCOPIC HERNIORRHAPHY HIATAL N/A 6/3/2022    Procedure: HERNIORRHAPHY, HIATAL, LAPAROSCOPIC, GASTROPEXY;  Surgeon: Davion Zuniga MD;  Location: [facility]         PERTINENT MEDICATIONS:  Current Outpatient Medications   Medication Sig Dispense Refill     bisacodyl (DULCOLAX) 5 MG EC tablet Take as directed. One day before exam take 2 tablets at 3 PM. Take 2 tablets at 11 PM. (Patient not taking: Reported on 11/8/2022) 4 tablet 0     blood glucose (NO BRAND  SPECIFIED) test strip 1 Strip by Misc.(Non-Drug; Combo Route) route.       CONTOUR NEXT TEST test strip USE TO TEST ONCE DAILY       dulaglutide (TRULICITY) 1.5 MG/0.5ML pen INJECT 0.5ML SUBCUTANEOUSLY ONCE WEEKLY       dutasteride (AVODART) 0.5 MG capsule Take 0.5 mg by mouth every 48 hours        empagliflozin (JARDIANCE) 25 MG TABS tablet Take 25 mg by mouth       fish oil-omega-3 fatty acids 1000 MG capsule Take 1 capsule by mouth (Patient not taking: Reported on 11/8/2022)       LISINOPRIL PO Take by mouth daily        metFORMIN (GLUCOPHAGE) 500 MG/5ML SOLN Take by mouth 2 times daily (with meals)       metoprolol tartrate (LOPRESSOR) 25 MG tablet Take 2 tablets (50 mg) by mouth 2 times daily 60 tablet 0     Microlet Lancets MISC USE TO TEST ONCE DAILY       oxyCODONE (ROXICODONE) 5 MG tablet Take 1 tablet (5 mg) by mouth every 6 hours as needed for breakthrough pain 5 tablet 0     pantoprazole (PROTONIX) 40 MG enteric coated tablet Take 1 tablet (40 mg) by mouth daily 90 tablet 0     polyethylene glycol (GOLYTELY) 236 g suspension Take as directed. One day before exam fill the jug with water. Cover and shake until well mixed. At 6 PM start drinking an 8oz glass of mixture every 15 minutes until jug is 1/2 empty. Store remainder in the refrigerator.  At 11 PM Start drinking the other half of the Golytely jug. Drink one 8-ounce glass every 15 minutes until the jug is empty. 4000 mL 0     pregabalin (LYRICA) 50 MG capsule Take 50 mg by mouth 3 times daily       ROPINIROLE HCL PO Take 0.25 mg by mouth At Bedtime       simvastatin (ZOCOR) 40 MG tablet Take 1 tablet (40 mg) by mouth every evening 30 tablet      tamsulosin (FLOMAX) 0.4 MG capsule TAKE 1 CAPSULE (0.4 MG) BY MOUTH EVERY MORNING.       VITAMIN D, CHOLECALCIFEROL, PO Take 2,000 Units by mouth daily        XARELTO ANTICOAGULANT 20 MG TABS tablet Take 20 mg by mouth daily         SOCIAL HISTORY:    Social History     Socioeconomic History     Marital  status: Single     Spouse name: Not on file     Number of children: Not on file     Years of education: Not on file     Highest education level: Not on file   Occupational History     Not on file   Tobacco Use     Smoking status: Never     Smokeless tobacco: Never   Vaping Use     Vaping status: Not on file   Substance and Sexual Activity     Alcohol use: Yes     Comment: Rare use     Drug use: No     Sexual activity: Not on file   Other Topics Concern     Parent/sibling w/ CABG, MI or angioplasty before 65F 55M? Not Asked   Social History Narrative    Retired, former      Social Determinants of Health     Financial Resource Strain: Not on file   Food Insecurity: Not on file   Transportation Needs: Not on file   Physical Activity: Not on file   Stress: Not on file   Social Connections: Not on file   Intimate Partner Violence: Not on file   Housing Stability: Not on file       FAMILY HISTORY:  Family History   Problem Relation Age of Onset     Cardiovascular Father      Peptic Ulcer Disease Mother        PHYSICAL EXAMINATION:  Vitals reviewed  There were no vitals taken for this visit.  Video physical exam  General: Patient appears well in no acute distress.   Skin: No visualized rash or lesions on visualized skin  Eyes: EOMI, no erythema, sclera icterus or discharge noted  Resp: Appears to be breathing comfortably without accessory muscle usage, speaking in full sentences, no cough  MSK: Appears to have normal range of motion based on visualized movements  Neurologic: No apparent tremors, facial movements symmetric  Psych: affect normal, alert and oriented    The rest of a comprehensive physical examination is deferred due to PHE (public health emergency) video restrictions      PERTINENT STUDIES Reviewed in EMR    ASSESSMENT/PLAN:    # Positive C diff PCR  Clyde initially presented with chronic diarrhea following hospitalization for duodenal obstruction in March 2022, with report of intermittent  loose stools associated with fecal incontinence and fecal urgency. Work-up for chronic diarrhea including enteric pathogen testing, including C diff x2, fecal elastase, and colonoscopy with random biopsies was unrevealing. He was subsequently tested again for C diff PCR on 9/3/2022, this time positive, without antecedent antibiotic exposure outside of a single dose of perioperative Ancef 6/2022. He did report partial symptom improvement on a standard course of oral vancomycin, with recurrent, episodic loose stool following completion. He was empirically started on a vancomycin taper, with symptom resolution.    He did well for around seven months, and then abruptly developed nausea, vomiting, and diarrhea. No known sick contacts or preceding antibiotic exposures. He went to the ED and was subsequently admitted, with work-up notable for a normal CBC, and unremarkable colon on CT. Stool testing was positive for C diff by PCR. Toxin EIA was not performed, nor was a fecal calprotectin.    We discussed that it would be quite unusual to have a spontaneous C diff recurrence seven months after initial episode. Generally, any new infection greater than 6 months from previous infection would be considered a novel C difficile episode. There were no preceding antibiotic exposures, which make true C difficile infection less likely. We discussed the limitations of using C diff PCR alone to evaluate for C difficile infection. This can be indicative of colonization, and can be positive due to previous history of CDI, as well as from being in the hospital. Given the lack of inflammatory change in the colon or leukocytosis, as well as the clinical presentation, an viral gastroenteritis could have been the cause of his symptoms as well. His case was reviewed with Dr. Navraro, with the agreement to complete taper, and monitor for recurrence. If he develops recurrent, persistent loose stools for at least 3 days, would recommend  providing stool sample for C diff. Ideally, we would use our updated test that includes PCR as well as EIA and GDH, however this unlikely to be available locally. In that event, I would recommend C diff PCR in conjunction with a fecal calprotectin to help distinguish between colonization and true infection.     If he does have a future recurrence of C diff, even if compatible with colonization, we do typically offer intestinal microbiota transplant (IMT) to those who are eligible. He is a good candidate for this given he does not require systemic antibiotics with any regular frequency.  We did discuss this, and he is interested in this should he have a future recurrence. He understands to contact our clinic with recurrent diarrhea and/or positive C dif testing, at which time we can arrange a clinic follow-up to discuss an IMT.    In regard to pending treatment for Lyme's, encouraged him to initiate the doxycyline now while on the vancomycin. He is asymptomatic from a GI standpoint.     RTC PRN, pending clinical course    Thank you for this consultation. It was a pleasure to participate in the care of this patient; please contact us with any further questions.    Tomeka Plummer PA-C    73 minutes spent on the date of the encounter doing chart review, review of outside records, review of test results, interpretation of tests, patient visit and documentation, discussing with provider

## 2023-05-30 NOTE — PATIENT INSTRUCTIONS
It was a pleasure taking care of you today.  I've included a brief summary of our discussion and care plan from today's visit below.  Please review this information with your primary care provider.  _______________________________________________________________________    My recommendations are summarized as follows:    1) Complete the oral vancomycin as prescribed  2) With any recurrence in diarrhea, please plan to get the stool testing done. This should include C difficile testing AND a fecal calprotectin (this assesses for inflammation)    Return to GI Clinic as needed ______________________________________________________________________    How do I schedule labs, imaging studies, or procedures that were ordered in clinic today?     Labs: To schedule lab appointment at the [facility], use my chart or call [phone]. If you have a [place] lab closer to home where you are regularly seen you can give them a call.     Procedures: If a colonoscopy, upper endoscopy, breath test, esophageal manometry, or pH impedence was ordered today, our endoscopy team will call you to schedule this. If you have not heard from our endoscopy team within a week, please call [phone] option 2 to schedule.     Imaging Studies: If you were scheduled for a CT scan, X-ray, MRI, ultrasound, HIDA scan or other imaging study, please call [phone] to have this scheduled.     Referral: If a referral to another specialty was ordered, expect a phone call or follow instructions above. If you have not heard from anyone regarding your referral in a week, please call our clinic to check the status.     Who do I call with any questions after my visit?  Please be in touch if there are any further questions that arise following today's visit.  There are multiple ways to contact your gastroenterology care team.      During business hours, you may reach a Gastroenterology nurse at [phone]    To schedule or reschedule an  appointment, please call [phone].     You can always send a secure message through [employer].  [employer] messages are answered by your nurse or doctor typically within 24 hours.  Please allow extra time on weekends and holidays.      For urgent/emergent questions after business hours, you may reach the on-call GI Fellow by contacting the [facility]  at [phone].     How will I get the results of any tests ordered?    You will receive all of your results.  If you have signed up for [employer], any tests ordered at your visit will be available to you after your physician reviews them.  Typically this takes 1-2 weeks.  If there are urgent results that require a change in your care plan, your physician or nurse will call you to discuss the next steps.      What is [employer]?  [employer] is a secure way for you to access all of your healthcare records from the [facility].  It is a web based computer program, so you can sign on to it from any location.  It also allows you to send secure messages to your care team.  I recommend signing up for [employer] access if you have not already done so and are comfortable with using a computer.      How to I schedule a follow-up visit?  If you did not schedule a follow-up visit today, please call [phone] to schedule a follow-up office visit.      Sincerely,    Tomeka Plummer PA-C  Division of Gastroenterology, Hepatology & Nutrition  [facility]

## 2023-05-30 NOTE — NURSING NOTE
Is the patient currently in the state of [place]? YES    Visit mode:VIDEO    If the visit is dropped, the patient can be reconnected by: VIDEO VISIT: Text to cell phone: [phone]    Will anyone else be joining the visit? YES: How would they like to receive their invitation? Other e-mail: Partner will be on camera      How would you like to obtain your AVS? MyChart    Are changes needed to the allergy or medication list? NO    Reason for visit: RECHECK

## 2023-06-01 ENCOUNTER — TELEPHONE (OUTPATIENT)
Dept: GASTROENTEROLOGY | Facility: CLINIC | Age: 73
End: 2023-06-01
Payer: COMMERCIAL

## 2023-06-16 ENCOUNTER — TELEPHONE (OUTPATIENT)
Dept: GASTROENTEROLOGY | Facility: CLINIC | Age: 73
End: 2023-06-16
Payer: COMMERCIAL

## 2023-06-20 NOTE — TELEPHONE ENCOUNTER
Spoke with pt to confirm that they are scheduled to see Dr Navarro in person on 9/20.  Pt asked for details to be relayed. Pt will be at scheduled appt for c-diff.

## 2023-09-20 ENCOUNTER — OFFICE VISIT (OUTPATIENT)
Dept: GASTROENTEROLOGY | Facility: CLINIC | Age: 73
End: 2023-09-20
Payer: COMMERCIAL

## 2023-09-20 VITALS
DIASTOLIC BLOOD PRESSURE: 97 MMHG | HEIGHT: 72 IN | OXYGEN SATURATION: 96 % | SYSTOLIC BLOOD PRESSURE: 193 MMHG | WEIGHT: 174.9 LBS | BODY MASS INDEX: 23.69 KG/M2 | HEART RATE: 66 BPM

## 2023-09-20 DIAGNOSIS — R19.7 DIARRHEA, UNSPECIFIED TYPE: Primary | ICD-10-CM

## 2023-09-20 PROCEDURE — 99215 OFFICE O/P EST HI 40 MIN: CPT | Performed by: INTERNAL MEDICINE

## 2023-09-20 ASSESSMENT — PAIN SCALES - GENERAL: PAINLEVEL: NO PAIN (0)

## 2023-09-20 NOTE — CONFIDENTIAL NOTE
Mr. Diaz is a 73-year-old man who is here for follow-up of diarrhea.    Patient has history of chronic diarrhea dating back to an admission for a gastric outlet obstruction hospitalization in 2022.  At the time he had extensive work-up including negative enteric pathogen testing and negative colonoscopy.  The gastric outlet obstruction was caused by annular pancreas, and the patient underwent gastrojejunostomy in June 2022.  The diarrhea continued and eventually he was diagnosed with C. difficile infection by PCR testing.  He was treated with vancomycin with modest response and symptoms.  At the time his diarrhea was quite severe with 10 bowel movements per day, liquid stools, and fecal incontinence.  Dificid was recommended, but he could not afford it.  Patient underwent treatment with vancomycin taper.  There was at least 1 hospitalization related to the diarrhea.  The last course of vancomycin was completed in May 2023.  After that his primary care physician put him on Metamucil and gradually his symptoms improved.  At this time the patient has about 3 bowel movements per day.  They are soft but formed.  Episodes of fecal incontinence are very rare.    On physical exam the patient appears in no acute distress.  He was hypertensive with repeat blood pressure measurements of about 180/95.  He appears well-nourished.  Since these hospitalizations he has gained about 20 pounds of weight to the current of 175.  His BMI is 23.7.    Social history.  Patient lives with his significant other in [place].  He drives a tractor for a farmer.  He hunts deer and does fishing.  He is a retired .    Assessment and plan.  It is not clear to me the patient ever had C. difficile infection.  However that may be he did get antibiotics and developed antibiotic induced dysbiosis which may have been driving his diarrhea or contributed to it.  It appears his symptoms are recovering.  Recovery of dysbiosis induced  by antibiotics typically takes many months.  I recommended continuing fiber supplementation and using Imodium as needed especially when he is out on a long trip.     The patient is recommended to follow-up with his primary care physician regarding his high blood pressure. The patient does have a history of a CVA.    Total time spent in this visit including review of medical records documentation and coordination of care was 45 minutes.

## 2023-09-20 NOTE — LETTER
9/20/2023         RE: Clyde Figueroa  [id number] [facility] [id number]        Dear Colleague,    Thank you for referring your patient, Clyde Figueroa, to the [facility]. Please see a copy of my visit note below.    No notes on file    Again, thank you for allowing me to participate in the care of your patient.        Sincerely,        Delvin Navarro MD

## 2023-09-20 NOTE — NURSING NOTE
Chief Complaint   Patient presents with    Follow Up     4 month follow up for recurrent C-Diff.     He requests these members of his care team be copied on today's visit information:  PCP: Luis Turpin    Vitals:    09/20/23 0741   BP: (!) 173/96   BP Location: Left arm   Patient Position: Sitting   Cuff Size: Adult Regular   Pulse: 66   SpO2: 96%   Weight: 79.3 kg (174 lb 14.4 oz)   Height: 1.829 m (6')     Body mass index is 23.72 kg/m .    Medications were reconciled.        Guerline Davis, CMA

## (undated) DEVICE — NDL INSUFFLATION 13GA 150MM C2202

## (undated) DEVICE — SU VICRYL 0 UR-6 27" J603H

## (undated) DEVICE — DRAIN PENROSE 1/4"X18" LATEX  30416-025

## (undated) DEVICE — PREP CHLORAPREP 26ML TINTED HI-LITE ORANGE 930815

## (undated) DEVICE — ENDO TROCAR SLEEVE KII Z-THREADED 12X100MM CTS22

## (undated) DEVICE — ESU GROUND PAD ADULT W/CORD E7507

## (undated) DEVICE — DECANTER BAG 2002S

## (undated) DEVICE — LINEN TOWEL PACK X30 5481

## (undated) DEVICE — SU ENDO STITCH SURGIDAC 2-0 ES-9 7" TRIPLE STITCH 170041

## (undated) DEVICE — ESU ENDO SCISSORS 5MM CVD 5DCS

## (undated) DEVICE — STPL SKIN 35W ROTATING HEAD PRW35

## (undated) DEVICE — ENDO TROCAR SLEEVE KII ADV FIXATION 05X100MM CFS02

## (undated) DEVICE — Device

## (undated) DEVICE — DEVICE SUTURE PASSER 14GA WECK EFX EFXSP2

## (undated) DEVICE — SU MONOCRYL 4-0 PS-2 27" UND Y426H

## (undated) DEVICE — SU ENDO STITCH POLYSORB 3-0 7" UND 170072

## (undated) DEVICE — SU ENDO STITCH POLYSORB 2-0 ES-9 48"  170053

## (undated) DEVICE — ENDO DISSECTOR BLUNT 05MM  BTD05

## (undated) DEVICE — ANTIFOG SOLUTION W/FOAM PAD CF-1002

## (undated) DEVICE — ESU LIGASURE MARYLAND VESSEL LAP 44CM XLONG LF1944

## (undated) DEVICE — GOWN XLG DISP 9545

## (undated) DEVICE — LINEN TOWEL PACK X6 WHITE 5487

## (undated) DEVICE — ENDO TROCAR FIRST ENTRY KII FIOS Z-THRD 12X100MM CTF73

## (undated) DEVICE — SYR 03ML LL W/O NDL 309657

## (undated) DEVICE — NDL SPINAL 22GA 3.5" QUINCKE 405181

## (undated) DEVICE — SOL WATER IRRIG 1000ML BOTTLE 2F7114

## (undated) DEVICE — SUCTION IRR STRYKERFLOW II W/TIP 250-070-520

## (undated) DEVICE — SYR 30ML LL W/O NDL 302832

## (undated) DEVICE — TUBING SMOKE EVAC PNEUMOCLEAR HEATED 0620050350

## (undated) DEVICE — DEVICE ENDO STITCH APPLIER 10MM 173016

## (undated) DEVICE — DRSG PRIMAPORE 02X3" 7133

## (undated) DEVICE — ENDO TROCAR FIRST ENTRY KII FIOS ADV FIX 05X100MM CFF03

## (undated) RX ORDER — BUPIVACAINE HYDROCHLORIDE 2.5 MG/ML
INJECTION, SOLUTION EPIDURAL; INFILTRATION; INTRACAUDAL
Status: DISPENSED
Start: 2022-06-03

## (undated) RX ORDER — FENTANYL CITRATE 50 UG/ML
INJECTION, SOLUTION INTRAMUSCULAR; INTRAVENOUS
Status: DISPENSED
Start: 2021-06-30

## (undated) RX ORDER — METOPROLOL TARTRATE 25 MG/1
TABLET, FILM COATED ORAL
Status: DISPENSED
Start: 2022-06-03

## (undated) RX ORDER — SIMETHICONE 20 MG/.3ML
EMULSION ORAL
Status: DISPENSED
Start: 2018-08-08

## (undated) RX ORDER — LIDOCAINE HYDROCHLORIDE AND EPINEPHRINE 10; 10 MG/ML; UG/ML
INJECTION, SOLUTION INFILTRATION; PERINEURAL
Status: DISPENSED
Start: 2022-06-03

## (undated) RX ORDER — BUPIVACAINE HYDROCHLORIDE AND EPINEPHRINE 5; 5 MG/ML; UG/ML
INJECTION, SOLUTION EPIDURAL; INTRACAUDAL; PERINEURAL
Status: DISPENSED
Start: 2022-06-03

## (undated) RX ORDER — FENTANYL CITRATE 50 UG/ML
INJECTION, SOLUTION INTRAMUSCULAR; INTRAVENOUS
Status: DISPENSED
Start: 2022-08-10

## (undated) RX ORDER — FENTANYL CITRATE 50 UG/ML
INJECTION, SOLUTION INTRAMUSCULAR; INTRAVENOUS
Status: DISPENSED
Start: 2022-06-03

## (undated) RX ORDER — DIPHENHYDRAMINE HYDROCHLORIDE 50 MG/ML
INJECTION INTRAMUSCULAR; INTRAVENOUS
Status: DISPENSED
Start: 2018-08-08

## (undated) RX ORDER — DIPHENHYDRAMINE HYDROCHLORIDE 50 MG/ML
INJECTION INTRAMUSCULAR; INTRAVENOUS
Status: DISPENSED
Start: 2021-06-30

## (undated) RX ORDER — FENTANYL CITRATE 50 UG/ML
INJECTION, SOLUTION INTRAMUSCULAR; INTRAVENOUS
Status: DISPENSED
Start: 2018-08-08

## (undated) RX ORDER — HEPARIN SODIUM 5000 [USP'U]/.5ML
INJECTION, SOLUTION INTRAVENOUS; SUBCUTANEOUS
Status: DISPENSED
Start: 2022-06-03

## (undated) RX ORDER — CEFAZOLIN SODIUM/WATER 2 G/20 ML
SYRINGE (ML) INTRAVENOUS
Status: DISPENSED
Start: 2022-06-03

## (undated) RX ORDER — HYDRALAZINE HYDROCHLORIDE 20 MG/ML
INJECTION INTRAMUSCULAR; INTRAVENOUS
Status: DISPENSED
Start: 2022-06-03

## (undated) RX ORDER — BUPIVACAINE HYDROCHLORIDE AND EPINEPHRINE 5; 5 MG/ML; UG/ML
INJECTION, SOLUTION PERINEURAL
Status: DISPENSED
Start: 2022-06-03

## (undated) RX ORDER — LABETALOL HYDROCHLORIDE 5 MG/ML
INJECTION, SOLUTION INTRAVENOUS
Status: DISPENSED
Start: 2022-06-03